# Patient Record
Sex: FEMALE | Race: WHITE | NOT HISPANIC OR LATINO | URBAN - METROPOLITAN AREA
[De-identification: names, ages, dates, MRNs, and addresses within clinical notes are randomized per-mention and may not be internally consistent; named-entity substitution may affect disease eponyms.]

---

## 2017-01-11 ENCOUNTER — INPATIENT (INPATIENT)
Facility: HOSPITAL | Age: 47
LOS: 5 days | Discharge: NURSING, REHAB OR LONG-TERM CARE | End: 2017-01-17
Attending: INTERNAL MEDICINE | Admitting: INTERNAL MEDICINE
Payer: MEDICARE

## 2017-01-11 VITALS
DIASTOLIC BLOOD PRESSURE: 72 MMHG | HEART RATE: 53 BPM | SYSTOLIC BLOOD PRESSURE: 121 MMHG | OXYGEN SATURATION: 100 % | RESPIRATION RATE: 18 BRPM | TEMPERATURE: 97 F

## 2017-01-11 DIAGNOSIS — K22.719 BARRETT'S ESOPHAGUS WITH DYSPLASIA, UNSPECIFIED: ICD-10-CM

## 2017-01-11 DIAGNOSIS — Z41.8 ENCOUNTER FOR OTHER PROCEDURES FOR PURPOSES OTHER THAN REMEDYING HEALTH STATE: ICD-10-CM

## 2017-01-11 DIAGNOSIS — F73 PROFOUND INTELLECTUAL DISABILITIES: ICD-10-CM

## 2017-01-11 DIAGNOSIS — L03.115 CELLULITIS OF RIGHT LOWER LIMB: ICD-10-CM

## 2017-01-11 LAB
BASE EXCESS BLDV CALC-SCNC: 6.8 MMOL/L — SIGNIFICANT CHANGE UP
BASOPHILS # BLD AUTO: 0.02 K/UL — SIGNIFICANT CHANGE UP (ref 0–0.2)
BASOPHILS NFR BLD AUTO: 0.2 % — SIGNIFICANT CHANGE UP (ref 0–2)
BLOOD GAS VENOUS - CREATININE: 0.59 MG/DL — SIGNIFICANT CHANGE UP (ref 0.5–1.3)
BUN SERPL-MCNC: 16 MG/DL — SIGNIFICANT CHANGE UP (ref 7–23)
CALCIUM SERPL-MCNC: 9 MG/DL — SIGNIFICANT CHANGE UP (ref 8.4–10.5)
CHLORIDE BLDV-SCNC: 105 MMOL/L — SIGNIFICANT CHANGE UP (ref 96–108)
CHLORIDE SERPL-SCNC: 103 MMOL/L — SIGNIFICANT CHANGE UP (ref 98–107)
CO2 SERPL-SCNC: 27 MMOL/L — SIGNIFICANT CHANGE UP (ref 22–31)
CREAT SERPL-MCNC: 0.61 MG/DL — SIGNIFICANT CHANGE UP (ref 0.5–1.3)
EOSINOPHIL # BLD AUTO: 0.03 K/UL — SIGNIFICANT CHANGE UP (ref 0–0.5)
EOSINOPHIL NFR BLD AUTO: 0.4 % — SIGNIFICANT CHANGE UP (ref 0–6)
GAS PNL BLDV: 136 MMOL/L — SIGNIFICANT CHANGE UP (ref 136–146)
GLUCOSE BLDV-MCNC: 86 — SIGNIFICANT CHANGE UP (ref 70–99)
GLUCOSE SERPL-MCNC: 87 MG/DL — SIGNIFICANT CHANGE UP (ref 70–99)
HCO3 BLDV-SCNC: 29 MMOL/L — HIGH (ref 20–27)
HCT VFR BLD CALC: 35.9 % — SIGNIFICANT CHANGE UP (ref 34.5–45)
HCT VFR BLDV CALC: 35.7 % — SIGNIFICANT CHANGE UP (ref 34.5–45)
HGB BLD-MCNC: 11.3 G/DL — LOW (ref 11.5–15.5)
HGB BLDV-MCNC: 11.6 G/DL — SIGNIFICANT CHANGE UP (ref 11.5–15.5)
HYPOCHROMIA BLD QL: SLIGHT — SIGNIFICANT CHANGE UP
IMM GRANULOCYTES NFR BLD AUTO: 0.4 % — SIGNIFICANT CHANGE UP (ref 0–1.5)
LACTATE BLDV-MCNC: 1.9 MMOL/L — SIGNIFICANT CHANGE UP (ref 0.5–2)
LYMPHOCYTES # BLD AUTO: 2.31 K/UL — SIGNIFICANT CHANGE UP (ref 1–3.3)
LYMPHOCYTES # BLD AUTO: 27.2 % — SIGNIFICANT CHANGE UP (ref 13–44)
MANUAL SMEAR VERIFICATION: SIGNIFICANT CHANGE UP
MCHC RBC-ENTMCNC: 29.6 PG — SIGNIFICANT CHANGE UP (ref 27–34)
MCHC RBC-ENTMCNC: 31.5 % — LOW (ref 32–36)
MCV RBC AUTO: 94 FL — SIGNIFICANT CHANGE UP (ref 80–100)
MONOCYTES # BLD AUTO: 1.7 K/UL — HIGH (ref 0–0.9)
MONOCYTES NFR BLD AUTO: 20 % — HIGH (ref 2–14)
NEUTROPHILS # BLD AUTO: 4.4 K/UL — SIGNIFICANT CHANGE UP (ref 1.8–7.4)
NEUTROPHILS NFR BLD AUTO: 51.8 % — SIGNIFICANT CHANGE UP (ref 43–77)
PCO2 BLDV: 51 MMHG — SIGNIFICANT CHANGE UP (ref 41–51)
PH BLDV: 7.41 PH — SIGNIFICANT CHANGE UP (ref 7.32–7.43)
PLATELET # BLD AUTO: 177 K/UL — SIGNIFICANT CHANGE UP (ref 150–400)
PLATELET COUNT - ESTIMATE: NORMAL — SIGNIFICANT CHANGE UP
PMV BLD: 12.1 FL — SIGNIFICANT CHANGE UP (ref 7–13)
PO2 BLDV: 25 MMHG — LOW (ref 35–40)
POTASSIUM BLDV-SCNC: 4 MMOL/L — SIGNIFICANT CHANGE UP (ref 3.4–4.5)
POTASSIUM SERPL-MCNC: 4.4 MMOL/L — SIGNIFICANT CHANGE UP (ref 3.5–5.3)
POTASSIUM SERPL-SCNC: 4.4 MMOL/L — SIGNIFICANT CHANGE UP (ref 3.5–5.3)
RBC # BLD: 3.82 M/UL — SIGNIFICANT CHANGE UP (ref 3.8–5.2)
RBC # FLD: 14.3 % — SIGNIFICANT CHANGE UP (ref 10.3–14.5)
SAO2 % BLDV: 38.6 % — LOW (ref 60–85)
SODIUM SERPL-SCNC: 142 MMOL/L — SIGNIFICANT CHANGE UP (ref 135–145)
WBC # BLD: 8.49 K/UL — SIGNIFICANT CHANGE UP (ref 3.8–10.5)
WBC # FLD AUTO: 8.49 K/UL — SIGNIFICANT CHANGE UP (ref 3.8–10.5)

## 2017-01-11 PROCEDURE — 99223 1ST HOSP IP/OBS HIGH 75: CPT

## 2017-01-11 PROCEDURE — 71010: CPT | Mod: 26

## 2017-01-11 RX ORDER — LANOLIN ALCOHOL/MO/W.PET/CERES
3 CREAM (GRAM) TOPICAL AT BEDTIME
Qty: 0 | Refills: 0 | Status: DISCONTINUED | OUTPATIENT
Start: 2017-01-11 | End: 2017-01-17

## 2017-01-11 RX ORDER — POLYETHYLENE GLYCOL 3350 17 G/17G
17 POWDER, FOR SOLUTION ORAL AT BEDTIME
Qty: 0 | Refills: 0 | Status: DISCONTINUED | OUTPATIENT
Start: 2017-01-11 | End: 2017-01-17

## 2017-01-11 RX ORDER — CLONAZEPAM 1 MG
2 TABLET ORAL
Qty: 0 | Refills: 0 | Status: DISCONTINUED | OUTPATIENT
Start: 2017-01-11 | End: 2017-01-12

## 2017-01-11 RX ORDER — ENOXAPARIN SODIUM 100 MG/ML
40 INJECTION SUBCUTANEOUS EVERY 24 HOURS
Qty: 0 | Refills: 0 | Status: DISCONTINUED | OUTPATIENT
Start: 2017-01-11 | End: 2017-01-17

## 2017-01-11 RX ORDER — DOCUSATE SODIUM 100 MG
200 CAPSULE ORAL DAILY
Qty: 0 | Refills: 0 | Status: DISCONTINUED | OUTPATIENT
Start: 2017-01-11 | End: 2017-01-17

## 2017-01-11 RX ORDER — SODIUM CHLORIDE 9 MG/ML
1000 INJECTION INTRAMUSCULAR; INTRAVENOUS; SUBCUTANEOUS ONCE
Qty: 0 | Refills: 0 | Status: COMPLETED | OUTPATIENT
Start: 2017-01-11 | End: 2017-01-11

## 2017-01-11 RX ORDER — METOCLOPRAMIDE HCL 10 MG
5 TABLET ORAL
Qty: 0 | Refills: 0 | Status: DISCONTINUED | OUTPATIENT
Start: 2017-01-11 | End: 2017-01-17

## 2017-01-11 RX ORDER — TRIHEXYPHENIDYL HCL 2 MG
1 TABLET ORAL DAILY
Qty: 0 | Refills: 0 | Status: DISCONTINUED | OUTPATIENT
Start: 2017-01-11 | End: 2017-01-17

## 2017-01-11 RX ORDER — MAGNESIUM HYDROXIDE 400 MG/1
30 TABLET, CHEWABLE ORAL
Qty: 0 | Refills: 0 | COMMUNITY

## 2017-01-11 RX ORDER — TRAZODONE HCL 50 MG
100 TABLET ORAL AT BEDTIME
Qty: 0 | Refills: 0 | Status: DISCONTINUED | OUTPATIENT
Start: 2017-01-11 | End: 2017-01-17

## 2017-01-11 RX ORDER — ACETAMINOPHEN 500 MG
650 TABLET ORAL EVERY 6 HOURS
Qty: 0 | Refills: 0 | Status: DISCONTINUED | OUTPATIENT
Start: 2017-01-11 | End: 2017-01-17

## 2017-01-11 RX ORDER — LACTOBACILLUS ACIDOPHILUS 100MM CELL
1 CAPSULE ORAL DAILY
Qty: 0 | Refills: 0 | Status: DISCONTINUED | OUTPATIENT
Start: 2017-01-11 | End: 2017-01-17

## 2017-01-11 RX ORDER — ASENAPINE MALEATE 10 MG/1
5 TABLET SUBLINGUAL EVERY 24 HOURS
Qty: 0 | Refills: 0 | Status: DISCONTINUED | OUTPATIENT
Start: 2017-01-12 | End: 2017-01-17

## 2017-01-11 RX ORDER — PANTOPRAZOLE SODIUM 20 MG/1
40 TABLET, DELAYED RELEASE ORAL
Qty: 0 | Refills: 0 | Status: DISCONTINUED | OUTPATIENT
Start: 2017-01-11 | End: 2017-01-17

## 2017-01-11 RX ORDER — DIVALPROEX SODIUM 500 MG/1
1250 TABLET, DELAYED RELEASE ORAL AT BEDTIME
Qty: 0 | Refills: 0 | Status: DISCONTINUED | OUTPATIENT
Start: 2017-01-11 | End: 2017-01-17

## 2017-01-11 RX ORDER — SIMETHICONE 80 MG/1
80 TABLET, CHEWABLE ORAL THREE TIMES A DAY
Qty: 0 | Refills: 0 | Status: DISCONTINUED | OUTPATIENT
Start: 2017-01-11 | End: 2017-01-17

## 2017-01-11 RX ORDER — DIVALPROEX SODIUM 500 MG/1
1000 TABLET, DELAYED RELEASE ORAL DAILY
Qty: 0 | Refills: 0 | Status: DISCONTINUED | OUTPATIENT
Start: 2017-01-11 | End: 2017-01-17

## 2017-01-11 RX ORDER — FOLIC ACID 0.8 MG
1 TABLET ORAL DAILY
Qty: 0 | Refills: 0 | Status: DISCONTINUED | OUTPATIENT
Start: 2017-01-11 | End: 2017-01-17

## 2017-01-11 RX ADMIN — Medication 2 MILLIGRAM(S): at 22:58

## 2017-01-11 RX ADMIN — Medication 3 MILLIGRAM(S): at 22:28

## 2017-01-11 RX ADMIN — DIVALPROEX SODIUM 1250 MILLIGRAM(S): 500 TABLET, DELAYED RELEASE ORAL at 22:28

## 2017-01-11 RX ADMIN — SIMETHICONE 80 MILLIGRAM(S): 80 TABLET, CHEWABLE ORAL at 22:28

## 2017-01-11 RX ADMIN — Medication 100 MILLIGRAM(S): at 22:54

## 2017-01-11 RX ADMIN — Medication 100 MILLIGRAM(S): at 13:25

## 2017-01-11 RX ADMIN — Medication 650 MILLIGRAM(S): at 18:57

## 2017-01-11 RX ADMIN — ENOXAPARIN SODIUM 40 MILLIGRAM(S): 100 INJECTION SUBCUTANEOUS at 22:28

## 2017-01-11 RX ADMIN — Medication 100 MILLIGRAM(S): at 22:28

## 2017-01-11 RX ADMIN — SODIUM CHLORIDE 1000 MILLILITER(S): 9 INJECTION INTRAMUSCULAR; INTRAVENOUS; SUBCUTANEOUS at 13:00

## 2017-01-11 NOTE — ED PROVIDER NOTE - MEDICAL DECISION MAKING DETAILS
45 y/o F from  with increasing right thigh cellulitis since yesterday.  Febrile.  Check labs, cxs, CXR, ECG.  Likely admission for IV antibiotics and continued evaluation.

## 2017-01-11 NOTE — ED ADULT NURSE REASSESSMENT NOTE - NS ED NURSE REASSESS COMMENT FT1
Patient is in NAD, and has room available.  Report given to nurse on floor via phone.  Patient awaiting transportation. [facility] staff members remain present at the bedside.  Will continue to monitor patient closely. Denice VAZ

## 2017-01-11 NOTE — H&P ADULT. - HISTORY OF PRESENT ILLNESS
47 y/o F with profound MR (nonverbal at baseline.) GERD with Garcia's esophagus, presents from [facility] redness and induration on R thigh.  Symptoms were first noticed by staff yesterday.  She was evaluated by her doctor this morning, and was transferred to the ED for further management.  Per [facility] staff member at bedside, the only other abnormal thing she noticed was that patient was shivering today.      In the ED, she was given clindamycin for treatment of cellulitis, and 1L NS.  She was noted to have temp of 101.9.

## 2017-01-11 NOTE — ED PROVIDER NOTE - PMH
Amenorrhea (ICD9 626.0)    Constipation (ICD9 564.00)    GERD (Gastroesophageal Reflux Disease) (ICD9 530.81)    MR (Mental Retardation) (ICD9 319)

## 2017-01-11 NOTE — ED ADULT NURSE NOTE - OBJECTIVE STATEMENT
Pt is a 45 y/o female brought in from [facility] for eval of right thigh cellulitis that staff states is from this am. +bruising type discoloration noted with redness of right thigh. Staff denies any knowledge of fall or injury or trauma.  No tracking noted. Redness outlined. MD ordoñez in progress. Will continue to monitor.

## 2017-01-11 NOTE — ED PROVIDER NOTE - ATTENDING CONTRIBUTION TO CARE
I performed a face to face evaluation of this patient and performed a full history and physical examination on the patient.  I agree with the resident's history, physical examination, and plan of the patient.

## 2017-01-11 NOTE — ED PROVIDER NOTE - OBJECTIVE STATEMENT
45 yo nonverbal F from [facility] w/ PMH of Garcia's esophagus, dysphagia, gastroparesis, profound mental retardation presents with an indurated lesion on the right thigh. Pt presented with 2 aides. According to her aides, the lesion was first noticed yesterday afternoon. The pt was seen by an MD at her facility this morning and sent to the ED. The aides deny that she was having an fevers, nausea, or vomiting, but they do relate that she was having chills. Deny any other lesions or complaints at this time. 47 yo nonverbal F from [facility] w/ PMH of Garcia's esophagus, dysphagia, gastroparesis, profound mental retardation presents with an indurated lesion on the right thigh. Pt presented with 2 aides. According to her aides, the lesion was first noticed yesterday afternoon. The pt was seen by an MD at her facility this morning and sent to the ED. The aides deny that she was having an fevers, nausea, or vomiting, but they do relate that she was having chills. Deny any other lesions or complaints at this time.  Attending - Agree with above.  I evaluated patient myself.  Reviewed transfer paperwork.  45 y/o F with multiple medical problems including MR, schizophrenia, nonverbal.  2 Aides from  who know patient well at bedside.  Noted to have expanding area of erythema and induration on right thigh since yesterday.  No known trauma to area.  Has been eating and drinking as normal.  No noted urinary problem.  No other complaint per staff.

## 2017-01-11 NOTE — ED PROVIDER NOTE - PHYSICAL EXAMINATION
ATTENDING PHYSICAL EXAM DR. Tom ***GEN - No resp distress ***HEAD - NC/AT ***EYES/NOSE - PERRL, mucous membranes moist, no discharge ***THROAT: Oral cavity and pharynx normal. no upper teeth, No inflammation, swelling, exudate, or lesions.  ***NECK: Neck supple, non-tender without lymphadenopathy, no masses, no JVD.   ***PULMONARY - CTA b/l, symmetric breath sounds. ***CARDIAC -s1s2, RRR, 2-3/6 sys murmur LUSB  ***ABDOMEN - +BS, ND, NT, soft, no guarding, no rebound, no masses   ***BACK - no CVA tenderness, Normal  spine ***EXTREMITIES - Noted 28-20cm well demarcated area of erythema and induration on right medial aspect of thigh with areas of ecchymosis, no fluctuence appreciated, no pointing or drainage.  Pos warmth.  Lower leg with normal skin, no apparent calf tenderness, no edema, distal pulses and cap refill intact.  ***NEUROLOGIC - alert/eyes open, looks at examiner, no verbal response, does not follow commands.

## 2017-01-11 NOTE — ED ADULT TRIAGE NOTE - CHIEF COMPLAINT QUOTE
Arrives from [facility] with two Aides for evaluation of "large approximate 8" x 5" induration warm on R upper thigh with  some dark decolorization"  Pt is non-verbal in triage and noted to be restless.

## 2017-01-12 LAB
BUN SERPL-MCNC: 11 MG/DL — SIGNIFICANT CHANGE UP (ref 7–23)
CALCIUM SERPL-MCNC: 8.9 MG/DL — SIGNIFICANT CHANGE UP (ref 8.4–10.5)
CHLORIDE SERPL-SCNC: 101 MMOL/L — SIGNIFICANT CHANGE UP (ref 98–107)
CO2 SERPL-SCNC: 27 MMOL/L — SIGNIFICANT CHANGE UP (ref 22–31)
CREAT SERPL-MCNC: 0.57 MG/DL — SIGNIFICANT CHANGE UP (ref 0.5–1.3)
GLUCOSE SERPL-MCNC: 104 MG/DL — HIGH (ref 70–99)
HCT VFR BLD CALC: 32.6 % — LOW (ref 34.5–45)
HGB BLD-MCNC: 10.5 G/DL — LOW (ref 11.5–15.5)
MAGNESIUM SERPL-MCNC: 1.8 MG/DL — SIGNIFICANT CHANGE UP (ref 1.6–2.6)
MCHC RBC-ENTMCNC: 29.5 PG — SIGNIFICANT CHANGE UP (ref 27–34)
MCHC RBC-ENTMCNC: 32.2 % — SIGNIFICANT CHANGE UP (ref 32–36)
MCV RBC AUTO: 91.6 FL — SIGNIFICANT CHANGE UP (ref 80–100)
PLATELET # BLD AUTO: 172 K/UL — SIGNIFICANT CHANGE UP (ref 150–400)
PMV BLD: 11.3 FL — SIGNIFICANT CHANGE UP (ref 7–13)
POTASSIUM SERPL-MCNC: 4.3 MMOL/L — SIGNIFICANT CHANGE UP (ref 3.5–5.3)
POTASSIUM SERPL-SCNC: 4.3 MMOL/L — SIGNIFICANT CHANGE UP (ref 3.5–5.3)
RBC # BLD: 3.56 M/UL — LOW (ref 3.8–5.2)
RBC # FLD: 14.1 % — SIGNIFICANT CHANGE UP (ref 10.3–14.5)
SODIUM SERPL-SCNC: 139 MMOL/L — SIGNIFICANT CHANGE UP (ref 135–145)
SPECIMEN SOURCE: SIGNIFICANT CHANGE UP
SPECIMEN SOURCE: SIGNIFICANT CHANGE UP
WBC # BLD: 7.12 K/UL — SIGNIFICANT CHANGE UP (ref 3.8–10.5)
WBC # FLD AUTO: 7.12 K/UL — SIGNIFICANT CHANGE UP (ref 3.8–10.5)

## 2017-01-12 RX ORDER — ACETAMINOPHEN 500 MG
650 TABLET ORAL ONCE
Qty: 0 | Refills: 0 | Status: COMPLETED | OUTPATIENT
Start: 2017-01-12 | End: 2017-01-12

## 2017-01-12 RX ORDER — KETOROLAC TROMETHAMINE 30 MG/ML
15 SYRINGE (ML) INJECTION ONCE
Qty: 0 | Refills: 0 | Status: DISCONTINUED | OUTPATIENT
Start: 2017-01-12 | End: 2017-01-12

## 2017-01-12 RX ORDER — VANCOMYCIN HCL 1 G
VIAL (EA) INTRAVENOUS
Qty: 0 | Refills: 0 | Status: DISCONTINUED | OUTPATIENT
Start: 2017-01-12 | End: 2017-01-14

## 2017-01-12 RX ORDER — CEFAZOLIN SODIUM 1 G
2000 VIAL (EA) INJECTION EVERY 8 HOURS
Qty: 0 | Refills: 0 | Status: DISCONTINUED | OUTPATIENT
Start: 2017-01-12 | End: 2017-01-17

## 2017-01-12 RX ORDER — CLONAZEPAM 1 MG
2 TABLET ORAL
Qty: 0 | Refills: 0 | Status: DISCONTINUED | OUTPATIENT
Start: 2017-01-12 | End: 2017-01-17

## 2017-01-12 RX ORDER — VANCOMYCIN HCL 1 G
1000 VIAL (EA) INTRAVENOUS EVERY 12 HOURS
Qty: 0 | Refills: 0 | Status: DISCONTINUED | OUTPATIENT
Start: 2017-01-13 | End: 2017-01-14

## 2017-01-12 RX ORDER — ACETAMINOPHEN 500 MG
650 TABLET ORAL EVERY 6 HOURS
Qty: 0 | Refills: 0 | Status: DISCONTINUED | OUTPATIENT
Start: 2017-01-12 | End: 2017-01-17

## 2017-01-12 RX ORDER — VANCOMYCIN HCL 1 G
1000 VIAL (EA) INTRAVENOUS ONCE
Qty: 0 | Refills: 0 | Status: COMPLETED | OUTPATIENT
Start: 2017-01-12 | End: 2017-01-12

## 2017-01-12 RX ADMIN — ENOXAPARIN SODIUM 40 MILLIGRAM(S): 100 INJECTION SUBCUTANEOUS at 21:46

## 2017-01-12 RX ADMIN — SIMETHICONE 80 MILLIGRAM(S): 80 TABLET, CHEWABLE ORAL at 21:46

## 2017-01-12 RX ADMIN — Medication 1 TABLET(S): at 11:31

## 2017-01-12 RX ADMIN — Medication 100 MILLIGRAM(S): at 21:45

## 2017-01-12 RX ADMIN — Medication 650 MILLIGRAM(S): at 05:47

## 2017-01-12 RX ADMIN — Medication 250 MILLIGRAM(S): at 19:12

## 2017-01-12 RX ADMIN — Medication 1 MILLIGRAM(S): at 12:54

## 2017-01-12 RX ADMIN — Medication 100 MILLIGRAM(S): at 05:38

## 2017-01-12 RX ADMIN — Medication 100 MILLIGRAM(S): at 23:12

## 2017-01-12 RX ADMIN — Medication 10 MILLILITER(S): at 15:31

## 2017-01-12 RX ADMIN — Medication 3 MILLIGRAM(S): at 23:12

## 2017-01-12 RX ADMIN — Medication 5 MILLIGRAM(S): at 12:54

## 2017-01-12 RX ADMIN — Medication 650 MILLIGRAM(S): at 06:40

## 2017-01-12 RX ADMIN — Medication 1 MILLIGRAM(S): at 11:31

## 2017-01-12 RX ADMIN — Medication 5 MILLIGRAM(S): at 06:24

## 2017-01-12 RX ADMIN — Medication 2 MILLIGRAM(S): at 05:38

## 2017-01-12 RX ADMIN — DIVALPROEX SODIUM 1000 MILLIGRAM(S): 500 TABLET, DELAYED RELEASE ORAL at 11:31

## 2017-01-12 RX ADMIN — Medication 2 MILLIGRAM(S): at 17:56

## 2017-01-12 RX ADMIN — DIVALPROEX SODIUM 1250 MILLIGRAM(S): 500 TABLET, DELAYED RELEASE ORAL at 23:12

## 2017-01-12 RX ADMIN — Medication 15 MILLIGRAM(S): at 19:27

## 2017-01-12 RX ADMIN — Medication 200 MILLIGRAM(S): at 11:31

## 2017-01-12 RX ADMIN — Medication 10 MILLILITER(S): at 05:38

## 2017-01-12 RX ADMIN — Medication 1 TABLET(S): at 12:55

## 2017-01-12 RX ADMIN — SIMETHICONE 80 MILLIGRAM(S): 80 TABLET, CHEWABLE ORAL at 05:38

## 2017-01-12 RX ADMIN — Medication 15 MILLIGRAM(S): at 19:12

## 2017-01-12 RX ADMIN — Medication 650 MILLIGRAM(S): at 15:27

## 2017-01-12 RX ADMIN — PANTOPRAZOLE SODIUM 40 MILLIGRAM(S): 20 TABLET, DELAYED RELEASE ORAL at 06:24

## 2017-01-12 RX ADMIN — POLYETHYLENE GLYCOL 3350 17 GRAM(S): 17 POWDER, FOR SOLUTION ORAL at 21:45

## 2017-01-12 RX ADMIN — Medication 10 MILLILITER(S): at 21:46

## 2017-01-12 RX ADMIN — SIMETHICONE 80 MILLIGRAM(S): 80 TABLET, CHEWABLE ORAL at 15:27

## 2017-01-12 RX ADMIN — ASENAPINE MALEATE 5 MILLIGRAM(S): 10 TABLET SUBLINGUAL at 05:38

## 2017-01-13 LAB
BACTERIA BLD CULT: SIGNIFICANT CHANGE UP
BUN SERPL-MCNC: 21 MG/DL — SIGNIFICANT CHANGE UP (ref 7–23)
CALCIUM SERPL-MCNC: 8.5 MG/DL — SIGNIFICANT CHANGE UP (ref 8.4–10.5)
CHLORIDE SERPL-SCNC: 102 MMOL/L — SIGNIFICANT CHANGE UP (ref 98–107)
CO2 SERPL-SCNC: 28 MMOL/L — SIGNIFICANT CHANGE UP (ref 22–31)
CREAT SERPL-MCNC: 0.59 MG/DL — SIGNIFICANT CHANGE UP (ref 0.5–1.3)
GLUCOSE SERPL-MCNC: 104 MG/DL — HIGH (ref 70–99)
HCT VFR BLD CALC: 31 % — LOW (ref 34.5–45)
HGB BLD-MCNC: 9.9 G/DL — LOW (ref 11.5–15.5)
MCHC RBC-ENTMCNC: 29.6 PG — SIGNIFICANT CHANGE UP (ref 27–34)
MCHC RBC-ENTMCNC: 31.9 % — LOW (ref 32–36)
MCV RBC AUTO: 92.5 FL — SIGNIFICANT CHANGE UP (ref 80–100)
PLATELET # BLD AUTO: 190 K/UL — SIGNIFICANT CHANGE UP (ref 150–400)
PMV BLD: 11.1 FL — SIGNIFICANT CHANGE UP (ref 7–13)
POTASSIUM SERPL-MCNC: 4.1 MMOL/L — SIGNIFICANT CHANGE UP (ref 3.5–5.3)
POTASSIUM SERPL-SCNC: 4.1 MMOL/L — SIGNIFICANT CHANGE UP (ref 3.5–5.3)
RBC # BLD: 3.35 M/UL — LOW (ref 3.8–5.2)
RBC # FLD: 14.1 % — SIGNIFICANT CHANGE UP (ref 10.3–14.5)
SODIUM SERPL-SCNC: 142 MMOL/L — SIGNIFICANT CHANGE UP (ref 135–145)
WBC # BLD: 6.53 K/UL — SIGNIFICANT CHANGE UP (ref 3.8–10.5)
WBC # FLD AUTO: 6.53 K/UL — SIGNIFICANT CHANGE UP (ref 3.8–10.5)

## 2017-01-13 RX ADMIN — Medication 10 MILLILITER(S): at 05:38

## 2017-01-13 RX ADMIN — Medication 1 MILLIGRAM(S): at 14:30

## 2017-01-13 RX ADMIN — POLYETHYLENE GLYCOL 3350 17 GRAM(S): 17 POWDER, FOR SOLUTION ORAL at 22:56

## 2017-01-13 RX ADMIN — Medication 1 MILLIGRAM(S): at 13:10

## 2017-01-13 RX ADMIN — Medication 10 MILLILITER(S): at 22:54

## 2017-01-13 RX ADMIN — Medication 200 MILLIGRAM(S): at 13:10

## 2017-01-13 RX ADMIN — ASENAPINE MALEATE 5 MILLIGRAM(S): 10 TABLET SUBLINGUAL at 07:42

## 2017-01-13 RX ADMIN — Medication 250 MILLIGRAM(S): at 05:39

## 2017-01-13 RX ADMIN — SIMETHICONE 80 MILLIGRAM(S): 80 TABLET, CHEWABLE ORAL at 22:51

## 2017-01-13 RX ADMIN — PANTOPRAZOLE SODIUM 40 MILLIGRAM(S): 20 TABLET, DELAYED RELEASE ORAL at 05:38

## 2017-01-13 RX ADMIN — DIVALPROEX SODIUM 1000 MILLIGRAM(S): 500 TABLET, DELAYED RELEASE ORAL at 14:32

## 2017-01-13 RX ADMIN — Medication 5 MILLIGRAM(S): at 13:10

## 2017-01-13 RX ADMIN — Medication 100 MILLIGRAM(S): at 22:50

## 2017-01-13 RX ADMIN — SIMETHICONE 80 MILLIGRAM(S): 80 TABLET, CHEWABLE ORAL at 13:10

## 2017-01-13 RX ADMIN — DIVALPROEX SODIUM 1250 MILLIGRAM(S): 500 TABLET, DELAYED RELEASE ORAL at 22:53

## 2017-01-13 RX ADMIN — Medication 2 MILLIGRAM(S): at 18:38

## 2017-01-13 RX ADMIN — Medication 2 MILLIGRAM(S): at 05:39

## 2017-01-13 RX ADMIN — ENOXAPARIN SODIUM 40 MILLIGRAM(S): 100 INJECTION SUBCUTANEOUS at 22:50

## 2017-01-13 RX ADMIN — Medication 1 TABLET(S): at 13:12

## 2017-01-13 RX ADMIN — Medication 1 TABLET(S): at 13:10

## 2017-01-13 RX ADMIN — Medication 100 MILLIGRAM(S): at 21:30

## 2017-01-13 RX ADMIN — Medication 10 MILLILITER(S): at 13:09

## 2017-01-13 RX ADMIN — Medication 5 MILLIGRAM(S): at 16:00

## 2017-01-13 RX ADMIN — Medication 100 MILLIGRAM(S): at 07:41

## 2017-01-13 RX ADMIN — SIMETHICONE 80 MILLIGRAM(S): 80 TABLET, CHEWABLE ORAL at 05:39

## 2017-01-13 RX ADMIN — Medication 5 MILLIGRAM(S): at 05:39

## 2017-01-13 RX ADMIN — Medication 100 MILLIGRAM(S): at 13:12

## 2017-01-13 RX ADMIN — Medication 250 MILLIGRAM(S): at 18:38

## 2017-01-13 NOTE — DIETITIAN INITIAL EVALUATION ADULT. - OTHER INFO
Pt was admitted with Celullitis of right lower extremity. Received nutrition consult for chewing/swallowing difficulty. Pt continues on Pureed diet and remains with good tolerance. Noted with 100% of meal completion. Denies any nausea/vomiting/diarrhea or constipation. Pt's Aide was not able to comment on weight status.

## 2017-01-14 LAB
BUN SERPL-MCNC: 18 MG/DL — SIGNIFICANT CHANGE UP (ref 7–23)
CALCIUM SERPL-MCNC: 8.7 MG/DL — SIGNIFICANT CHANGE UP (ref 8.4–10.5)
CHLORIDE SERPL-SCNC: 100 MMOL/L — SIGNIFICANT CHANGE UP (ref 98–107)
CO2 SERPL-SCNC: 29 MMOL/L — SIGNIFICANT CHANGE UP (ref 22–31)
CREAT SERPL-MCNC: 0.61 MG/DL — SIGNIFICANT CHANGE UP (ref 0.5–1.3)
GLUCOSE SERPL-MCNC: 80 MG/DL — SIGNIFICANT CHANGE UP (ref 70–99)
GRAM STAIN BLOOD: SIGNIFICANT CHANGE UP
GRAM STAIN BLOOD: SIGNIFICANT CHANGE UP
HCT VFR BLD CALC: 31.6 % — LOW (ref 34.5–45)
HGB BLD-MCNC: 10.3 G/DL — LOW (ref 11.5–15.5)
MCHC RBC-ENTMCNC: 29.9 PG — SIGNIFICANT CHANGE UP (ref 27–34)
MCHC RBC-ENTMCNC: 32.6 % — SIGNIFICANT CHANGE UP (ref 32–36)
MCV RBC AUTO: 91.9 FL — SIGNIFICANT CHANGE UP (ref 80–100)
PLATELET # BLD AUTO: 234 K/UL — SIGNIFICANT CHANGE UP (ref 150–400)
PMV BLD: 10.4 FL — SIGNIFICANT CHANGE UP (ref 7–13)
POTASSIUM SERPL-MCNC: 4.4 MMOL/L — SIGNIFICANT CHANGE UP (ref 3.5–5.3)
POTASSIUM SERPL-SCNC: 4.4 MMOL/L — SIGNIFICANT CHANGE UP (ref 3.5–5.3)
RBC # BLD: 3.44 M/UL — LOW (ref 3.8–5.2)
RBC # FLD: 14.3 % — SIGNIFICANT CHANGE UP (ref 10.3–14.5)
SODIUM SERPL-SCNC: 140 MMOL/L — SIGNIFICANT CHANGE UP (ref 135–145)
SPECIMEN SOURCE: SIGNIFICANT CHANGE UP
SPECIMEN SOURCE: SIGNIFICANT CHANGE UP
VANCOMYCIN TROUGH SERPL-MCNC: 7.6 UG/ML — LOW (ref 10–20)
WBC # BLD: 7.49 K/UL — SIGNIFICANT CHANGE UP (ref 3.8–10.5)
WBC # FLD AUTO: 7.49 K/UL — SIGNIFICANT CHANGE UP (ref 3.8–10.5)

## 2017-01-14 RX ORDER — VANCOMYCIN HCL 1 G
1250 VIAL (EA) INTRAVENOUS EVERY 12 HOURS
Qty: 0 | Refills: 0 | Status: DISCONTINUED | OUTPATIENT
Start: 2017-01-14 | End: 2017-01-17

## 2017-01-14 RX ADMIN — Medication 10 MILLILITER(S): at 15:19

## 2017-01-14 RX ADMIN — DIVALPROEX SODIUM 1250 MILLIGRAM(S): 500 TABLET, DELAYED RELEASE ORAL at 22:22

## 2017-01-14 RX ADMIN — Medication 2 MILLIGRAM(S): at 07:18

## 2017-01-14 RX ADMIN — SIMETHICONE 80 MILLIGRAM(S): 80 TABLET, CHEWABLE ORAL at 22:31

## 2017-01-14 RX ADMIN — Medication 100 MILLIGRAM(S): at 07:20

## 2017-01-14 RX ADMIN — Medication 200 MILLIGRAM(S): at 12:00

## 2017-01-14 RX ADMIN — Medication 1 TABLET(S): at 12:00

## 2017-01-14 RX ADMIN — Medication 166.67 MILLIGRAM(S): at 09:34

## 2017-01-14 RX ADMIN — ASENAPINE MALEATE 5 MILLIGRAM(S): 10 TABLET SUBLINGUAL at 07:18

## 2017-01-14 RX ADMIN — Medication 650 MILLIGRAM(S): at 23:23

## 2017-01-14 RX ADMIN — Medication 5 MILLIGRAM(S): at 07:18

## 2017-01-14 RX ADMIN — SIMETHICONE 80 MILLIGRAM(S): 80 TABLET, CHEWABLE ORAL at 07:18

## 2017-01-14 RX ADMIN — Medication 650 MILLIGRAM(S): at 22:23

## 2017-01-14 RX ADMIN — Medication 100 MILLIGRAM(S): at 22:21

## 2017-01-14 RX ADMIN — PANTOPRAZOLE SODIUM 40 MILLIGRAM(S): 20 TABLET, DELAYED RELEASE ORAL at 07:17

## 2017-01-14 RX ADMIN — ENOXAPARIN SODIUM 40 MILLIGRAM(S): 100 INJECTION SUBCUTANEOUS at 22:20

## 2017-01-14 RX ADMIN — Medication 100 MILLIGRAM(S): at 22:28

## 2017-01-14 RX ADMIN — Medication 1 MILLIGRAM(S): at 12:00

## 2017-01-14 RX ADMIN — Medication 2 MILLIGRAM(S): at 18:58

## 2017-01-14 RX ADMIN — Medication 166.67 MILLIGRAM(S): at 21:47

## 2017-01-14 RX ADMIN — SIMETHICONE 80 MILLIGRAM(S): 80 TABLET, CHEWABLE ORAL at 15:24

## 2017-01-14 RX ADMIN — Medication 10 MILLILITER(S): at 07:19

## 2017-01-14 RX ADMIN — Medication 5 MILLIGRAM(S): at 18:58

## 2017-01-14 RX ADMIN — Medication 5 MILLIGRAM(S): at 12:00

## 2017-01-14 RX ADMIN — Medication 10 MILLILITER(S): at 22:20

## 2017-01-14 RX ADMIN — Medication 100 MILLIGRAM(S): at 15:18

## 2017-01-14 RX ADMIN — DIVALPROEX SODIUM 1000 MILLIGRAM(S): 500 TABLET, DELAYED RELEASE ORAL at 12:00

## 2017-01-15 LAB
BUN SERPL-MCNC: 21 MG/DL — SIGNIFICANT CHANGE UP (ref 7–23)
CALCIUM SERPL-MCNC: 9.1 MG/DL — SIGNIFICANT CHANGE UP (ref 8.4–10.5)
CHLORIDE SERPL-SCNC: 100 MMOL/L — SIGNIFICANT CHANGE UP (ref 98–107)
CO2 SERPL-SCNC: 29 MMOL/L — SIGNIFICANT CHANGE UP (ref 22–31)
CREAT SERPL-MCNC: 0.64 MG/DL — SIGNIFICANT CHANGE UP (ref 0.5–1.3)
GLUCOSE SERPL-MCNC: 90 MG/DL — SIGNIFICANT CHANGE UP (ref 70–99)
HCT VFR BLD CALC: 31.6 % — LOW (ref 34.5–45)
HGB BLD-MCNC: 10.3 G/DL — LOW (ref 11.5–15.5)
MCHC RBC-ENTMCNC: 29.9 PG — SIGNIFICANT CHANGE UP (ref 27–34)
MCHC RBC-ENTMCNC: 32.6 % — SIGNIFICANT CHANGE UP (ref 32–36)
MCV RBC AUTO: 91.9 FL — SIGNIFICANT CHANGE UP (ref 80–100)
PLATELET # BLD AUTO: 245 K/UL — SIGNIFICANT CHANGE UP (ref 150–400)
PMV BLD: 10.2 FL — SIGNIFICANT CHANGE UP (ref 7–13)
POTASSIUM SERPL-MCNC: 4.8 MMOL/L — SIGNIFICANT CHANGE UP (ref 3.5–5.3)
POTASSIUM SERPL-SCNC: 4.8 MMOL/L — SIGNIFICANT CHANGE UP (ref 3.5–5.3)
RBC # BLD: 3.44 M/UL — LOW (ref 3.8–5.2)
RBC # FLD: 14 % — SIGNIFICANT CHANGE UP (ref 10.3–14.5)
SODIUM SERPL-SCNC: 141 MMOL/L — SIGNIFICANT CHANGE UP (ref 135–145)
VANCOMYCIN TROUGH SERPL-MCNC: 10.6 UG/ML — SIGNIFICANT CHANGE UP (ref 10–20)
WBC # BLD: 7.03 K/UL — SIGNIFICANT CHANGE UP (ref 3.8–10.5)
WBC # FLD AUTO: 7.03 K/UL — SIGNIFICANT CHANGE UP (ref 3.8–10.5)

## 2017-01-15 RX ADMIN — Medication 100 MILLIGRAM(S): at 22:35

## 2017-01-15 RX ADMIN — DIVALPROEX SODIUM 1250 MILLIGRAM(S): 500 TABLET, DELAYED RELEASE ORAL at 22:39

## 2017-01-15 RX ADMIN — Medication 166.67 MILLIGRAM(S): at 23:15

## 2017-01-15 RX ADMIN — SIMETHICONE 80 MILLIGRAM(S): 80 TABLET, CHEWABLE ORAL at 09:39

## 2017-01-15 RX ADMIN — Medication 2 MILLIGRAM(S): at 18:22

## 2017-01-15 RX ADMIN — Medication 10 MILLILITER(S): at 14:58

## 2017-01-15 RX ADMIN — Medication 1 MILLIGRAM(S): at 14:57

## 2017-01-15 RX ADMIN — Medication 10 MILLILITER(S): at 07:21

## 2017-01-15 RX ADMIN — Medication 1 TABLET(S): at 14:56

## 2017-01-15 RX ADMIN — ASENAPINE MALEATE 5 MILLIGRAM(S): 10 TABLET SUBLINGUAL at 06:59

## 2017-01-15 RX ADMIN — ENOXAPARIN SODIUM 40 MILLIGRAM(S): 100 INJECTION SUBCUTANEOUS at 22:38

## 2017-01-15 RX ADMIN — Medication 5 MILLIGRAM(S): at 14:56

## 2017-01-15 RX ADMIN — Medication 1 MILLIGRAM(S): at 14:59

## 2017-01-15 RX ADMIN — Medication 200 MILLIGRAM(S): at 14:57

## 2017-01-15 RX ADMIN — Medication 5 MILLIGRAM(S): at 06:59

## 2017-01-15 RX ADMIN — PANTOPRAZOLE SODIUM 40 MILLIGRAM(S): 20 TABLET, DELAYED RELEASE ORAL at 06:59

## 2017-01-15 RX ADMIN — Medication 3 MILLIGRAM(S): at 22:35

## 2017-01-15 RX ADMIN — Medication 10 MILLILITER(S): at 22:35

## 2017-01-15 RX ADMIN — SIMETHICONE 80 MILLIGRAM(S): 80 TABLET, CHEWABLE ORAL at 14:57

## 2017-01-15 RX ADMIN — Medication 100 MILLIGRAM(S): at 06:58

## 2017-01-15 RX ADMIN — DIVALPROEX SODIUM 1000 MILLIGRAM(S): 500 TABLET, DELAYED RELEASE ORAL at 14:57

## 2017-01-15 RX ADMIN — Medication 5 MILLIGRAM(S): at 18:23

## 2017-01-15 RX ADMIN — Medication 166.67 MILLIGRAM(S): at 10:29

## 2017-01-15 RX ADMIN — Medication 100 MILLIGRAM(S): at 14:58

## 2017-01-15 RX ADMIN — SIMETHICONE 80 MILLIGRAM(S): 80 TABLET, CHEWABLE ORAL at 22:39

## 2017-01-15 RX ADMIN — Medication 2 MILLIGRAM(S): at 06:58

## 2017-01-16 ENCOUNTER — TRANSCRIPTION ENCOUNTER (OUTPATIENT)
Age: 47
End: 2017-01-16

## 2017-01-16 LAB
BACTERIA BLD CULT: SIGNIFICANT CHANGE UP
BUN SERPL-MCNC: 21 MG/DL — SIGNIFICANT CHANGE UP (ref 7–23)
C DIFF TOX GENS STL QL NAA+PROBE: SIGNIFICANT CHANGE UP
CALCIUM SERPL-MCNC: 8.7 MG/DL — SIGNIFICANT CHANGE UP (ref 8.4–10.5)
CHLORIDE SERPL-SCNC: 103 MMOL/L — SIGNIFICANT CHANGE UP (ref 98–107)
CO2 SERPL-SCNC: 29 MMOL/L — SIGNIFICANT CHANGE UP (ref 22–31)
CREAT SERPL-MCNC: 0.56 MG/DL — SIGNIFICANT CHANGE UP (ref 0.5–1.3)
GLUCOSE SERPL-MCNC: 91 MG/DL — SIGNIFICANT CHANGE UP (ref 70–99)
HCT VFR BLD CALC: 31.1 % — LOW (ref 34.5–45)
HGB BLD-MCNC: 9.9 G/DL — LOW (ref 11.5–15.5)
MCHC RBC-ENTMCNC: 29.6 PG — SIGNIFICANT CHANGE UP (ref 27–34)
MCHC RBC-ENTMCNC: 31.8 % — LOW (ref 32–36)
MCV RBC AUTO: 92.8 FL — SIGNIFICANT CHANGE UP (ref 80–100)
PLATELET # BLD AUTO: 234 K/UL — SIGNIFICANT CHANGE UP (ref 150–400)
PMV BLD: 9.6 FL — SIGNIFICANT CHANGE UP (ref 7–13)
POTASSIUM SERPL-MCNC: 4.8 MMOL/L — SIGNIFICANT CHANGE UP (ref 3.5–5.3)
POTASSIUM SERPL-SCNC: 4.8 MMOL/L — SIGNIFICANT CHANGE UP (ref 3.5–5.3)
RBC # BLD: 3.35 M/UL — LOW (ref 3.8–5.2)
RBC # FLD: 14 % — SIGNIFICANT CHANGE UP (ref 10.3–14.5)
SODIUM SERPL-SCNC: 142 MMOL/L — SIGNIFICANT CHANGE UP (ref 135–145)
VANCOMYCIN TROUGH SERPL-MCNC: 14.4 UG/ML — SIGNIFICANT CHANGE UP (ref 10–20)
WBC # BLD: 5.79 K/UL — SIGNIFICANT CHANGE UP (ref 3.8–10.5)
WBC # FLD AUTO: 5.79 K/UL — SIGNIFICANT CHANGE UP (ref 3.8–10.5)

## 2017-01-16 RX ADMIN — ENOXAPARIN SODIUM 40 MILLIGRAM(S): 100 INJECTION SUBCUTANEOUS at 21:24

## 2017-01-16 RX ADMIN — Medication 1 TABLET(S): at 11:17

## 2017-01-16 RX ADMIN — Medication 100 MILLIGRAM(S): at 06:43

## 2017-01-16 RX ADMIN — Medication 10 MILLILITER(S): at 13:26

## 2017-01-16 RX ADMIN — SIMETHICONE 80 MILLIGRAM(S): 80 TABLET, CHEWABLE ORAL at 06:45

## 2017-01-16 RX ADMIN — SIMETHICONE 80 MILLIGRAM(S): 80 TABLET, CHEWABLE ORAL at 13:26

## 2017-01-16 RX ADMIN — ASENAPINE MALEATE 5 MILLIGRAM(S): 10 TABLET SUBLINGUAL at 06:42

## 2017-01-16 RX ADMIN — PANTOPRAZOLE SODIUM 40 MILLIGRAM(S): 20 TABLET, DELAYED RELEASE ORAL at 06:43

## 2017-01-16 RX ADMIN — SIMETHICONE 80 MILLIGRAM(S): 80 TABLET, CHEWABLE ORAL at 21:19

## 2017-01-16 RX ADMIN — Medication 2 MILLIGRAM(S): at 06:43

## 2017-01-16 RX ADMIN — Medication 5 MILLIGRAM(S): at 06:43

## 2017-01-16 RX ADMIN — Medication 1 MILLIGRAM(S): at 11:17

## 2017-01-16 RX ADMIN — Medication 3 MILLIGRAM(S): at 21:19

## 2017-01-16 RX ADMIN — Medication 100 MILLIGRAM(S): at 21:19

## 2017-01-16 RX ADMIN — Medication 10 MILLILITER(S): at 21:18

## 2017-01-16 RX ADMIN — Medication 2 MILLIGRAM(S): at 18:03

## 2017-01-16 RX ADMIN — Medication 166.67 MILLIGRAM(S): at 21:23

## 2017-01-16 RX ADMIN — Medication 166.67 MILLIGRAM(S): at 09:40

## 2017-01-16 RX ADMIN — Medication 100 MILLIGRAM(S): at 13:26

## 2017-01-16 RX ADMIN — Medication 10 MILLILITER(S): at 06:42

## 2017-01-16 RX ADMIN — DIVALPROEX SODIUM 1000 MILLIGRAM(S): 500 TABLET, DELAYED RELEASE ORAL at 13:26

## 2017-01-16 RX ADMIN — DIVALPROEX SODIUM 1250 MILLIGRAM(S): 500 TABLET, DELAYED RELEASE ORAL at 21:19

## 2017-01-16 RX ADMIN — Medication 5 MILLIGRAM(S): at 16:11

## 2017-01-16 RX ADMIN — Medication 100 MILLIGRAM(S): at 23:06

## 2017-01-16 RX ADMIN — Medication 200 MILLIGRAM(S): at 11:17

## 2017-01-16 RX ADMIN — Medication 5 MILLIGRAM(S): at 11:17

## 2017-01-16 NOTE — DISCHARGE NOTE ADULT - MEDICATION SUMMARY - MEDICATIONS TO TAKE
I will START or STAY ON the medications listed below when I get home from the hospital:    acetaminophen 325 mg oral tablet  -- 2 tab(s) by mouth every 6 hours, As needed, For Temp greater than 38 C (100.4 F)  -- Indication: For Fever    acetaminophen 325 mg oral tablet  -- 2 tab(s) by mouth every 6 hours, As needed, mild-moderate  -- Indication: For Pain     Milk of Magnesia 8% oral suspension  -- 30 milliliter(s) by mouth 3 times a week  -- Indication: For Constipation     Mylanta oral suspension  -- 10 milliliter(s) by mouth 3 times  -- Indication: For GERD    clonazePAM 2 mg oral tablet  -- 1 tab(s) by mouth 2 times a day  -- Indication: For Mental retardation, profound (I.Q. < 20)    divalproex sodium 250 mg oral delayed release tablet  -- 1 tab(s) by mouth once a day (at bedtime); (together with 1000mg for total of 1250mg)   -- Indication: For Mental retardation, profound (I.Q. < 20)    divalproex sodium 500 mg oral delayed release tablet  -- 2 tablets by mouth in the morning and in the evening   -- Indication: For Mental retardation, profound (I.Q. < 20)    traZODone 100 mg oral tablet  -- 1 tab(s) by mouth once a day (at bedtime)  -- Indication: For Mental retardation, profound (I.Q. < 20)    Reglan 5 mg oral tablet  -- 1 tab(s) by mouth 3 times a day  -- Indication: For Nausea    trihexyphenidyl 2 mg oral tablet  -- 0.5 tab(s) by mouth once a day (in the morning)  -- Indication: For Mental retardation, profound (I.Q. < 20)    OLANZapine 5 mg oral tablet  -- 1 tab(s) by mouth once a day, As Needed clinic visit  -- Indication: For Mental retardation, profound (I.Q. < 20)    Saphris Black Lara 5 mg sublingual tablet  -- 1 tab(s) under tongue once a day (in the morning)  -- Indication: For Mental retardation, profound (I.Q. < 20)    docusate potassium 100 mg oral capsule  -- 2  by mouth once a day (at bedtime)  -- Indication: For Constipation     polyethylene glycol 3350 oral powder for reconstitution  -- 17 gram(s) by mouth once a day (at bedtime)  -- Indication: For Constipation     clindamycin  -- 450 milligram(s) by mouth every 8 hours  -- Indication: For Cellulitis of right lower extremity    simethicone 80 mg oral tablet  -- 1 tab(s) by mouth 3 times a day (after meals)  -- Indication: For GERD    melatonin 3 mg oral tablet  -- 2 tab(s) by mouth once (at bedtime)  -- Indication: For Insomnia    Bacid (LAC) oral capsule  -- 1 cap(s) by mouth once a day  -- Indication: For Probiotics    pantoprazole 40 mg oral delayed release tablet  -- 1 tab(s) by mouth once a day  -- Indication: For GERD    Citrus Calcium with Vitamin D 200 mg-250 intl units oral tablet  -- 2 tab(s) by mouth once a day  -- Indication: For sUPPLEMENT     Multiple Vitamins oral tablet  -- 1 tab(s) by mouth once a day  -- Indication: For Supplement     folic acid 1 mg oral tablet  -- 1 tab(s) by mouth once a day  -- Indication: For sUPPLEMENT

## 2017-01-16 NOTE — DISCHARGE NOTE ADULT - HOSPITAL COURSE
47 y/o F with profound MR (nonverbal at baseline.) GERD with Garcia's esophagus, presents from [facility] redness and induration on R thigh, treating for cellulitis    Hospital course: 45 y/o F with profound MR (nonverbal at baseline.) GERD with Garcia's esophagus, presents from [facility] redness and induration on R thigh, treating for cellulitis    Hospital course:    Cellulitis of right lower extremity.  - Clinda IV  - Bcx -> 1 bottle positive   - ID Dr Horton   -Abx changed to  Vanc/Cefazolin     Diarrhea  -1/15 diarrhea  -cdiff negative    Mental retardation, profound (I.Q. < 20).  - continue depakote.     Garcia's esophagus with dysplasia.  - protonix.     Diarrhea  -Resolved     Need for prophylactic measure.  -Lovenox.         Pt medically stable for discharge to [facility] w/ 6 more days of Clinda PO

## 2017-01-16 NOTE — PROVIDER CONTACT NOTE (OTHER) - RECOMMENDATIONS
MD Cesar notified with orders for Vanco trough/Stool for cdiff/ recommends tylenol PO/ blood cx ordered in the am
Continue to monitor

## 2017-01-16 NOTE — DISCHARGE NOTE ADULT - PLAN OF CARE
resolution of swelling and infection Continue with antibiotics as prescribed. Follow up with provider in 1 week. continue with depakote Resolution of swelling and infection Continue and complete your entire course of antibiotics as prescribed. [facility] 1 week. Continue with Depakote as directed

## 2017-01-16 NOTE — DISCHARGE NOTE ADULT - CARE PLAN
Principal Discharge DX:	Cellulitis of right lower extremity  Goal:	resolution of swelling and infection  Instructions for follow-up, activity and diet:	Continue with antibiotics as prescribed. Follow up with provider in 1 week.  Secondary Diagnosis:	Mental retardation, profound (I.Q. < 20)  Instructions for follow-up, activity and diet:	continue with depakote Principal Discharge DX:	Cellulitis of right lower extremity  Goal:	Resolution of swelling and infection  Instructions for follow-up, activity and diet:	Continue and complete your entire course of antibiotics as prescribed. Follow up with the [facility] medical care providers in 1 week.  Secondary Diagnosis:	Mental retardation, profound (I.Q. < 20)  Instructions for follow-up, activity and diet:	Continue with Depakote as directed

## 2017-01-16 NOTE — DISCHARGE NOTE ADULT - PATIENT PORTAL LINK FT
“You can access the FollowHealth Patient Portal, offered by [facility], by registering with the following website: [email or web address]”

## 2017-01-17 VITALS
HEART RATE: 113 BPM | DIASTOLIC BLOOD PRESSURE: 76 MMHG | TEMPERATURE: 99 F | SYSTOLIC BLOOD PRESSURE: 112 MMHG | OXYGEN SATURATION: 100 % | RESPIRATION RATE: 18 BRPM

## 2017-01-17 LAB
BUN SERPL-MCNC: 21 MG/DL — SIGNIFICANT CHANGE UP (ref 7–23)
CALCIUM SERPL-MCNC: 9.1 MG/DL — SIGNIFICANT CHANGE UP (ref 8.4–10.5)
CHLORIDE SERPL-SCNC: 100 MMOL/L — SIGNIFICANT CHANGE UP (ref 98–107)
CO2 SERPL-SCNC: 30 MMOL/L — SIGNIFICANT CHANGE UP (ref 22–31)
CREAT SERPL-MCNC: 0.86 MG/DL — SIGNIFICANT CHANGE UP (ref 0.5–1.3)
GLUCOSE SERPL-MCNC: 98 MG/DL — SIGNIFICANT CHANGE UP (ref 70–99)
HCT VFR BLD CALC: 33.3 % — LOW (ref 34.5–45)
HGB BLD-MCNC: 10.5 G/DL — LOW (ref 11.5–15.5)
MCHC RBC-ENTMCNC: 29.6 PG — SIGNIFICANT CHANGE UP (ref 27–34)
MCHC RBC-ENTMCNC: 31.5 % — LOW (ref 32–36)
MCV RBC AUTO: 93.8 FL — SIGNIFICANT CHANGE UP (ref 80–100)
PLATELET # BLD AUTO: 274 K/UL — SIGNIFICANT CHANGE UP (ref 150–400)
PMV BLD: 9.8 FL — SIGNIFICANT CHANGE UP (ref 7–13)
POTASSIUM SERPL-MCNC: 5 MMOL/L — SIGNIFICANT CHANGE UP (ref 3.5–5.3)
POTASSIUM SERPL-SCNC: 5 MMOL/L — SIGNIFICANT CHANGE UP (ref 3.5–5.3)
RBC # BLD: 3.55 M/UL — LOW (ref 3.8–5.2)
RBC # FLD: 14 % — SIGNIFICANT CHANGE UP (ref 10.3–14.5)
SODIUM SERPL-SCNC: 141 MMOL/L — SIGNIFICANT CHANGE UP (ref 135–145)
SPECIMEN SOURCE: SIGNIFICANT CHANGE UP
SPECIMEN SOURCE: SIGNIFICANT CHANGE UP
WBC # BLD: 9.38 K/UL — SIGNIFICANT CHANGE UP (ref 3.8–10.5)
WBC # FLD AUTO: 9.38 K/UL — SIGNIFICANT CHANGE UP (ref 3.8–10.5)

## 2017-01-17 RX ORDER — LACTOBACILLUS ACIDOPHILUS 100MM CELL
1 CAPSULE ORAL
Qty: 0 | Refills: 0 | COMMUNITY

## 2017-01-17 RX ORDER — ACETAMINOPHEN 500 MG
2 TABLET ORAL
Qty: 0 | Refills: 0 | COMMUNITY
Start: 2017-01-17

## 2017-01-17 RX ADMIN — DIVALPROEX SODIUM 1000 MILLIGRAM(S): 500 TABLET, DELAYED RELEASE ORAL at 12:34

## 2017-01-17 RX ADMIN — ASENAPINE MALEATE 5 MILLIGRAM(S): 10 TABLET SUBLINGUAL at 07:14

## 2017-01-17 RX ADMIN — Medication 166.67 MILLIGRAM(S): at 08:43

## 2017-01-17 RX ADMIN — Medication 5 MILLIGRAM(S): at 07:14

## 2017-01-17 RX ADMIN — Medication 1 MILLIGRAM(S): at 12:33

## 2017-01-17 RX ADMIN — Medication 5 MILLIGRAM(S): at 12:34

## 2017-01-17 RX ADMIN — Medication 10 MILLILITER(S): at 13:05

## 2017-01-17 RX ADMIN — Medication 2 MILLIGRAM(S): at 07:14

## 2017-01-17 RX ADMIN — Medication 10 MILLILITER(S): at 07:14

## 2017-01-17 RX ADMIN — Medication 200 MILLIGRAM(S): at 12:34

## 2017-01-17 RX ADMIN — SIMETHICONE 80 MILLIGRAM(S): 80 TABLET, CHEWABLE ORAL at 13:05

## 2017-01-17 RX ADMIN — SIMETHICONE 80 MILLIGRAM(S): 80 TABLET, CHEWABLE ORAL at 07:15

## 2017-01-17 RX ADMIN — Medication 1 TABLET(S): at 12:33

## 2017-01-17 RX ADMIN — Medication 100 MILLIGRAM(S): at 13:05

## 2017-01-17 RX ADMIN — Medication 1 MILLIGRAM(S): at 12:34

## 2017-01-17 RX ADMIN — Medication 100 MILLIGRAM(S): at 06:02

## 2017-01-17 RX ADMIN — PANTOPRAZOLE SODIUM 40 MILLIGRAM(S): 20 TABLET, DELAYED RELEASE ORAL at 07:14

## 2017-01-17 RX ADMIN — Medication 1 TABLET(S): at 12:34

## 2017-01-18 ENCOUNTER — INPATIENT (INPATIENT)
Facility: HOSPITAL | Age: 47
LOS: 1 days | Discharge: OTHER INSTITUTION | End: 2017-01-20
Attending: INTERNAL MEDICINE | Admitting: INTERNAL MEDICINE
Payer: MEDICARE

## 2017-01-18 VITALS
HEART RATE: 107 BPM | OXYGEN SATURATION: 95 % | SYSTOLIC BLOOD PRESSURE: 94 MMHG | RESPIRATION RATE: 16 BRPM | DIASTOLIC BLOOD PRESSURE: 63 MMHG | TEMPERATURE: 98 F

## 2017-01-18 DIAGNOSIS — N39.0 URINARY TRACT INFECTION, SITE NOT SPECIFIED: ICD-10-CM

## 2017-01-18 LAB
ALBUMIN SERPL ELPH-MCNC: 2.6 G/DL — LOW (ref 3.3–5)
ALP SERPL-CCNC: 64 U/L — SIGNIFICANT CHANGE UP (ref 40–120)
ALT FLD-CCNC: 20 U/L — SIGNIFICANT CHANGE UP (ref 4–33)
APPEARANCE UR: SIGNIFICANT CHANGE UP
AST SERPL-CCNC: 33 U/L — HIGH (ref 4–32)
B PERT DNA SPEC QL NAA+PROBE: SIGNIFICANT CHANGE UP
BACTERIA # UR AUTO: HIGH
BASE EXCESS BLDV CALC-SCNC: 10.1 MMOL/L — SIGNIFICANT CHANGE UP
BASOPHILS # BLD AUTO: 0.04 K/UL — SIGNIFICANT CHANGE UP (ref 0–0.2)
BASOPHILS NFR BLD AUTO: 0.4 % — SIGNIFICANT CHANGE UP (ref 0–2)
BILIRUB SERPL-MCNC: 0.2 MG/DL — SIGNIFICANT CHANGE UP (ref 0.2–1.2)
BILIRUB UR-MCNC: NEGATIVE — SIGNIFICANT CHANGE UP
BLOOD GAS VENOUS - CREATININE: 0.73 MG/DL — SIGNIFICANT CHANGE UP (ref 0.5–1.3)
BLOOD UR QL VISUAL: HIGH
BUN SERPL-MCNC: 21 MG/DL — SIGNIFICANT CHANGE UP (ref 7–23)
C PNEUM DNA SPEC QL NAA+PROBE: NOT DETECTED — SIGNIFICANT CHANGE UP
CALCIUM SERPL-MCNC: 8.9 MG/DL — SIGNIFICANT CHANGE UP (ref 8.4–10.5)
CHLORIDE BLDV-SCNC: 105 MMOL/L — SIGNIFICANT CHANGE UP (ref 96–108)
CHLORIDE SERPL-SCNC: 103 MMOL/L — SIGNIFICANT CHANGE UP (ref 98–107)
CO2 SERPL-SCNC: 31 MMOL/L — SIGNIFICANT CHANGE UP (ref 22–31)
COLOR SPEC: YELLOW — SIGNIFICANT CHANGE UP
CREAT SERPL-MCNC: 0.67 MG/DL — SIGNIFICANT CHANGE UP (ref 0.5–1.3)
EOSINOPHIL # BLD AUTO: 0.17 K/UL — SIGNIFICANT CHANGE UP (ref 0–0.5)
EOSINOPHIL NFR BLD AUTO: 1.8 % — SIGNIFICANT CHANGE UP (ref 0–6)
FLUAV H1 2009 PAND RNA SPEC QL NAA+PROBE: NOT DETECTED — SIGNIFICANT CHANGE UP
FLUAV H1 RNA SPEC QL NAA+PROBE: NOT DETECTED — SIGNIFICANT CHANGE UP
FLUAV H3 RNA SPEC QL NAA+PROBE: NOT DETECTED — SIGNIFICANT CHANGE UP
FLUAV SUBTYP SPEC NAA+PROBE: SIGNIFICANT CHANGE UP
FLUBV RNA SPEC QL NAA+PROBE: NOT DETECTED — SIGNIFICANT CHANGE UP
GAS PNL BLDV: 137 MMOL/L — SIGNIFICANT CHANGE UP (ref 136–146)
GLUCOSE BLDV-MCNC: 90 — SIGNIFICANT CHANGE UP (ref 70–99)
GLUCOSE SERPL-MCNC: 95 MG/DL — SIGNIFICANT CHANGE UP (ref 70–99)
GLUCOSE UR-MCNC: NEGATIVE — SIGNIFICANT CHANGE UP
HADV DNA SPEC QL NAA+PROBE: NOT DETECTED — SIGNIFICANT CHANGE UP
HCO3 BLDV-SCNC: 32 MMOL/L — HIGH (ref 20–27)
HCOV 229E RNA SPEC QL NAA+PROBE: NOT DETECTED — SIGNIFICANT CHANGE UP
HCOV HKU1 RNA SPEC QL NAA+PROBE: NOT DETECTED — SIGNIFICANT CHANGE UP
HCOV NL63 RNA SPEC QL NAA+PROBE: NOT DETECTED — SIGNIFICANT CHANGE UP
HCOV OC43 RNA SPEC QL NAA+PROBE: NOT DETECTED — SIGNIFICANT CHANGE UP
HCT VFR BLD CALC: 32.2 % — LOW (ref 34.5–45)
HCT VFR BLDV CALC: 26.9 % — LOW (ref 34.5–45)
HGB BLD-MCNC: 9.9 G/DL — LOW (ref 11.5–15.5)
HGB BLDV-MCNC: 8.7 G/DL — LOW (ref 11.5–15.5)
HMPV RNA SPEC QL NAA+PROBE: NOT DETECTED — SIGNIFICANT CHANGE UP
HPIV1 RNA SPEC QL NAA+PROBE: NOT DETECTED — SIGNIFICANT CHANGE UP
HPIV2 RNA SPEC QL NAA+PROBE: NOT DETECTED — SIGNIFICANT CHANGE UP
HPIV3 RNA SPEC QL NAA+PROBE: NOT DETECTED — SIGNIFICANT CHANGE UP
HPIV4 RNA SPEC QL NAA+PROBE: NOT DETECTED — SIGNIFICANT CHANGE UP
IMM GRANULOCYTES NFR BLD AUTO: 0.7 % — SIGNIFICANT CHANGE UP (ref 0–1.5)
KETONES UR-MCNC: NEGATIVE — SIGNIFICANT CHANGE UP
LACTATE BLDV-MCNC: 1.6 MMOL/L — SIGNIFICANT CHANGE UP (ref 0.5–2)
LEUKOCYTE ESTERASE UR-ACNC: HIGH
LYMPHOCYTES # BLD AUTO: 2.35 K/UL — SIGNIFICANT CHANGE UP (ref 1–3.3)
LYMPHOCYTES # BLD AUTO: 25.5 % — SIGNIFICANT CHANGE UP (ref 13–44)
M PNEUMO DNA SPEC QL NAA+PROBE: NOT DETECTED — SIGNIFICANT CHANGE UP
MAGNESIUM SERPL-MCNC: 2.2 MG/DL — SIGNIFICANT CHANGE UP (ref 1.6–2.6)
MCHC RBC-ENTMCNC: 29.1 PG — SIGNIFICANT CHANGE UP (ref 27–34)
MCHC RBC-ENTMCNC: 30.7 % — LOW (ref 32–36)
MCV RBC AUTO: 94.7 FL — SIGNIFICANT CHANGE UP (ref 80–100)
MONOCYTES # BLD AUTO: 1.03 K/UL — HIGH (ref 0–0.9)
MONOCYTES NFR BLD AUTO: 11.2 % — SIGNIFICANT CHANGE UP (ref 2–14)
MUCOUS THREADS # UR AUTO: SIGNIFICANT CHANGE UP
NEUTROPHILS # BLD AUTO: 5.56 K/UL — SIGNIFICANT CHANGE UP (ref 1.8–7.4)
NEUTROPHILS NFR BLD AUTO: 60.4 % — SIGNIFICANT CHANGE UP (ref 43–77)
NITRITE UR-MCNC: NEGATIVE — SIGNIFICANT CHANGE UP
NON-SQ EPI CELLS # UR AUTO: 3 — SIGNIFICANT CHANGE UP
PCO2 BLDV: 59 MMHG — HIGH (ref 41–51)
PH BLDV: 7.39 PH — SIGNIFICANT CHANGE UP (ref 7.32–7.43)
PH UR: 6.5 — SIGNIFICANT CHANGE UP (ref 4.6–8)
PHOSPHATE SERPL-MCNC: 3.8 MG/DL — SIGNIFICANT CHANGE UP (ref 2.5–4.5)
PLATELET # BLD AUTO: 297 K/UL — SIGNIFICANT CHANGE UP (ref 150–400)
PMV BLD: 9.6 FL — SIGNIFICANT CHANGE UP (ref 7–13)
PO2 BLDV: 27 MMHG — LOW (ref 35–40)
POTASSIUM BLDV-SCNC: 4.4 MMOL/L — SIGNIFICANT CHANGE UP (ref 3.4–4.5)
POTASSIUM SERPL-MCNC: 4.2 MMOL/L — SIGNIFICANT CHANGE UP (ref 3.5–5.3)
POTASSIUM SERPL-SCNC: 4.2 MMOL/L — SIGNIFICANT CHANGE UP (ref 3.5–5.3)
PROT SERPL-MCNC: 5.7 G/DL — LOW (ref 6–8.3)
PROT UR-MCNC: 150 — HIGH
RBC # BLD: 3.4 M/UL — LOW (ref 3.8–5.2)
RBC # FLD: 14.3 % — SIGNIFICANT CHANGE UP (ref 10.3–14.5)
RBC CASTS # UR COMP ASSIST: SIGNIFICANT CHANGE UP (ref 0–?)
RSV RNA SPEC QL NAA+PROBE: NOT DETECTED — SIGNIFICANT CHANGE UP
RV+EV RNA SPEC QL NAA+PROBE: NOT DETECTED — SIGNIFICANT CHANGE UP
SAO2 % BLDV: 42.9 % — LOW (ref 60–85)
SODIUM SERPL-SCNC: 143 MMOL/L — SIGNIFICANT CHANGE UP (ref 135–145)
SP GR SPEC: 1.02 — SIGNIFICANT CHANGE UP (ref 1–1.03)
SQUAMOUS # UR AUTO: SIGNIFICANT CHANGE UP
TSH SERPL-MCNC: 4.75 UIU/ML — HIGH (ref 0.27–4.2)
UROBILINOGEN FLD QL: NORMAL E.U. — SIGNIFICANT CHANGE UP (ref 0.1–0.2)
WBC # BLD: 9.21 K/UL — SIGNIFICANT CHANGE UP (ref 3.8–10.5)
WBC # FLD AUTO: 9.21 K/UL — SIGNIFICANT CHANGE UP (ref 3.8–10.5)
WBC CLUMPS #/AREA URNS HPF: PRESENT — HIGH (ref 0–?)
WBC UR QL: >50 — HIGH (ref 0–?)
YEAST BUDDING # UR COMP ASSIST: SIGNIFICANT CHANGE UP

## 2017-01-18 PROCEDURE — 71010: CPT | Mod: 26

## 2017-01-18 PROCEDURE — 99223 1ST HOSP IP/OBS HIGH 75: CPT

## 2017-01-18 RX ORDER — OLANZAPINE 15 MG/1
1 TABLET, FILM COATED ORAL
Qty: 0 | Refills: 0 | COMMUNITY

## 2017-01-18 RX ORDER — DOCUSATE SODIUM 100 MG
2 CAPSULE ORAL
Qty: 0 | Refills: 0 | COMMUNITY

## 2017-01-18 RX ORDER — SODIUM CHLORIDE 9 MG/ML
1000 INJECTION INTRAMUSCULAR; INTRAVENOUS; SUBCUTANEOUS ONCE
Qty: 0 | Refills: 0 | Status: COMPLETED | OUTPATIENT
Start: 2017-01-18 | End: 2017-01-18

## 2017-01-18 RX ORDER — LACTOBACILLUS ACIDOPHILUS 100MM CELL
1 CAPSULE ORAL
Qty: 0 | Refills: 0 | COMMUNITY

## 2017-01-18 RX ORDER — DIVALPROEX SODIUM 500 MG/1
1 TABLET, DELAYED RELEASE ORAL
Qty: 0 | Refills: 0 | COMMUNITY

## 2017-01-18 RX ORDER — MAGNESIUM HYDROXIDE 400 MG/1
30 TABLET, CHEWABLE ORAL
Qty: 0 | Refills: 0 | COMMUNITY

## 2017-01-18 RX ORDER — CEFTRIAXONE 500 MG/1
1 INJECTION, POWDER, FOR SOLUTION INTRAMUSCULAR; INTRAVENOUS ONCE
Qty: 0 | Refills: 0 | Status: COMPLETED | OUTPATIENT
Start: 2017-01-18 | End: 2017-01-18

## 2017-01-18 RX ORDER — FOLIC ACID 0.8 MG
1 TABLET ORAL
Qty: 0 | Refills: 0 | COMMUNITY

## 2017-01-18 RX ORDER — ENOXAPARIN SODIUM 100 MG/ML
40 INJECTION SUBCUTANEOUS EVERY 24 HOURS
Qty: 0 | Refills: 0 | Status: DISCONTINUED | OUTPATIENT
Start: 2017-01-18 | End: 2017-01-20

## 2017-01-18 RX ORDER — TRIHEXYPHENIDYL HCL 2 MG
0.5 TABLET ORAL
Qty: 0 | Refills: 0 | COMMUNITY

## 2017-01-18 RX ORDER — ASENAPINE MALEATE 10 MG/1
1 TABLET SUBLINGUAL
Qty: 0 | Refills: 0 | COMMUNITY

## 2017-01-18 RX ORDER — DOCUSATE SODIUM 100 MG
1 CAPSULE ORAL
Qty: 0 | Refills: 0 | COMMUNITY

## 2017-01-18 RX ORDER — SODIUM CHLORIDE 9 MG/ML
1000 INJECTION INTRAMUSCULAR; INTRAVENOUS; SUBCUTANEOUS
Qty: 0 | Refills: 0 | Status: DISCONTINUED | OUTPATIENT
Start: 2017-01-18 | End: 2017-01-20

## 2017-01-18 RX ORDER — DIVALPROEX SODIUM 500 MG/1
2 TABLET, DELAYED RELEASE ORAL
Qty: 0 | Refills: 0 | COMMUNITY

## 2017-01-18 RX ORDER — ZINC OXIDE 200 MG/G
0 OINTMENT TOPICAL
Qty: 0 | Refills: 0 | COMMUNITY

## 2017-01-18 RX ORDER — POLYETHYLENE GLYCOL 3350 17 G/17G
17 POWDER, FOR SOLUTION ORAL
Qty: 0 | Refills: 0 | COMMUNITY

## 2017-01-18 RX ORDER — LANOLIN ALCOHOL/MO/W.PET/CERES
2 CREAM (GRAM) TOPICAL
Qty: 0 | Refills: 0 | COMMUNITY

## 2017-01-18 RX ORDER — TRAZODONE HCL 50 MG
1 TABLET ORAL
Qty: 0 | Refills: 0 | COMMUNITY

## 2017-01-18 RX ORDER — SIMETHICONE 80 MG/1
1 TABLET, CHEWABLE ORAL
Qty: 0 | Refills: 0 | COMMUNITY

## 2017-01-18 RX ORDER — PANTOPRAZOLE SODIUM 20 MG/1
1 TABLET, DELAYED RELEASE ORAL
Qty: 0 | Refills: 0 | COMMUNITY

## 2017-01-18 RX ADMIN — SODIUM CHLORIDE 2000 MILLILITER(S): 9 INJECTION INTRAMUSCULAR; INTRAVENOUS; SUBCUTANEOUS at 20:26

## 2017-01-18 RX ADMIN — Medication 100 MILLIGRAM(S): at 22:17

## 2017-01-18 RX ADMIN — SODIUM CHLORIDE 1000 MILLILITER(S): 9 INJECTION INTRAMUSCULAR; INTRAVENOUS; SUBCUTANEOUS at 23:03

## 2017-01-18 RX ADMIN — CEFTRIAXONE 100 GRAM(S): 500 INJECTION, POWDER, FOR SOLUTION INTRAMUSCULAR; INTRAVENOUS at 20:51

## 2017-01-18 NOTE — H&P ADULT. - PROBLEM SELECTOR PLAN 1
- patient with lethargy from living facility  - nitrite negative UTI  - started on ceftriaxone; continued  - IVF - s/p 1 liter NS IVF bolus; additional bolus ordered, along with maintenance at 100 mL/Hr  - f/u urine culture

## 2017-01-18 NOTE — ED ADULT NURSE NOTE - OBJECTIVE STATEMENT
Patient received to room 7 awake, alert, nonverbal at baseline.  Patient aides from facility at bedside.  Patient normally walks but is refusing to walk at this time.  Patient recently discharged from [facility] with cellulitis infection.  Vitals recorded, hemodynamcially stable.  Safety maintained, will continue to monitor.

## 2017-01-18 NOTE — ED PROVIDER NOTE - MEDICAL DECISION MAKING DETAILS
46F non verbal MR p/w lethargy, tachycardia and hypotension. Concerning for dehydration vs sepsis. Will check CBC CMP RVP Blood Cx CXR. 1L NS. Will reassess.

## 2017-01-18 NOTE — ED PROVIDER NOTE - DIAGNOSIS COUNSELING, MDM
conducted a detailed discussion... I had a detailed discussion with the patient and/or guardian regarding the historical points, exam findings, and any diagnostic results supporting the admit diagnosis. I had a detailed discussion with the patient and guardian regarding the historical points, exam findings, and any diagnostic results supporting the admit diagnosis.

## 2017-01-18 NOTE — H&P ADULT. - ENMT COMMENTS
poor dentition; eroded teeth at upper jaw and lower jaw with unhealthy appearing teeth (h/o teeth grinding)

## 2017-01-18 NOTE — ED PROVIDER NOTE - PROGRESS NOTE DETAILS
BP improved to 100/70s. Pt still lethargic. UA via straight cath with large LE, >50WBCs. Given ceftriaxone and clindamycin. D/w Dr. Moulton who last admitted pt. Will admit to Dr. Moulton.

## 2017-01-18 NOTE — ED PROVIDER NOTE - PHYSICAL EXAMINATION
Pt alert, awake, does not follow commands at baseline, moving all extremities with contractures b/l UE.

## 2017-01-18 NOTE — H&P ADULT. - PROBLEM SELECTOR PLAN 2
- recently treated for same  - erythema resolved, per residency aides, but firmness, tenderness and area of involvement remains the same (per aides)  - patient refusing to bear weight on extremity and has RLE flexed, per staff  - discharged on clincamycin 450 mg BID  - prescribed clincamycin 900 mg IV in the ED; continued on clindaymcin 600 mg IV Q8H  - continued evaluation for any acute signs/symptoms

## 2017-01-18 NOTE — H&P ADULT. - PROBLEM SELECTOR PLAN 4
- BUN/Cr = 21/0.67 (ratio of 31:1)  - in the setting of UTI  - also contributing to presenting lethargy (now improved)  - hydration as mentioned above  - follow repeat lab-work,  intake/output for improvement

## 2017-01-18 NOTE — H&P ADULT. - ASSESSMENT
46 year old female, with PH significant for Mental retardation, Non-verbal status, GERD, Barretts esophagus, Constipation, Cellulitis (or right thigh), presented to the ED secondary to lethargy.  Diagnosed with Urinary Tract Infection.  Admitted for further management of:

## 2017-01-18 NOTE — ED PROVIDER NOTE - ATTENDING CONTRIBUTION TO CARE
DR. WALDEN, ATTENDING MD-  I performed a face to face bedside interview with patient regarding history of present illness, review of symptoms and past medical history. I completed an independent physical exam.  I have discussed patient's plan of care with the resident.   Documentation as above in the note.  HPI: 47 yo F with MR (non verbal at baseline, resident at [facility]), higginbotham's esophagus p/w lethargy today. was just discharged from hospital for RLE thigh cellulitis currently on clindamycin. Pt not able to communicate verbally, aide at bedside.   EXAM: Right inner thigh with induration but no erythema, not warm. lungs ctab, abd soft and non tender.   MDM: obtain infectious workup given history and presentation. UA, CXR.   UA shows UTI, straight cath to obtain urine. Will start IVAbx and admit.

## 2017-01-18 NOTE — H&P ADULT. - PROBLEM SELECTOR PLAN 3
- albumin = 2.6  - may be protein-calorie deficient  - consider nutrition consult  - currently on dysphagia diet - ?chiefly due to poor dentition  - consider any possibility of toothache/discomfort affecting patient's PO intake

## 2017-01-18 NOTE — ED ADULT TRIAGE NOTE - CHIEF COMPLAINT QUOTE
Pt discharged yesterday from facility for cellulitis. Presents today with fever, lethargy and refusing to walk. Non verbal at baseline.

## 2017-01-18 NOTE — ED PROVIDER NOTE - OBJECTIVE STATEMENT
46 F PMH MR (non verbal at baseline, resident at [facility]), higginbotham's esophagus p/w lethargy since this morning. [facility] employees at bedside report that she was discharged from the hospital yesterday for cellulitis. Today she has not been acting normally, being more lethargic and less vocal than usual. Pt typically ambulates but is unable to walk today 2/2 weakness. No fevers, chills, vomiting, diarrhea, 46 F Past Medical History MR (non verbal at baseline, resident at [facility]), higginbotham's esophagus p/w lethargy since this morning. [facility] employees at bedside report that she was discharged from the hospital yesterday for cellulitis. Today she has not been acting normally, being more lethargic and less vocal than usual. Pt typically ambulates but is unable to walk today 2/2 weakness. No fevers, chills, vomiting, diarrhea,

## 2017-01-18 NOTE — H&P ADULT. - PMH
Amenorrhea (ICD9 626.0)    Barretts esophagus    Constipation (ICD9 564.00)    GERD (Gastroesophageal Reflux Disease) (ICD9 530.81)    MR (Mental Retardation) (ICD9 319)    Nonverbal    Teeth grinding

## 2017-01-18 NOTE — H&P ADULT. - HISTORY OF PRESENT ILLNESS
46 year old female 46 year old female, with PH significant for Mental retardation, Non-verbal status, GERD, Barretts esophagus, Constipation, Cellulitis (or right thigh), presented to the ED secondary to lethargy 46 year old female, with PH significant for Mental retardation, Non-verbal status, GERD, Barretts esophagus, Constipation, Cellulitis (or right thigh), presented to the ED secondary to lethargy.  Seen and evaluated at bedside with 2 residency aides present; NAD.  Alert, tracts with eyes, but does follow commands; unable to contribute toward medical history.  Per reports, patient was discharged from the Hospital on the previous day after treatment for "right upper thigh swelling (cellulitis/abscess)."  She was noted to be febrile the next day, refused to walk and bear weight on the foot.  Right hip was externally rotated and kept in a flexed position.  Tylenol was prescribed for increased pain.  Patient was also on clindamycin 450 mg TID.  Facility aides noted fever in the AM and chills.  Aides also reported that right thigh swelling no longer demonstrated erythema as prior to previous admission; swelling, however, continued.  No reports of coughing, rhinorrhea, SOB, diarrhea, rash or any other associated signs symptoms.  Patient was transferred back to [facility] for evaluation/management.    Vital signs upon ED presentation as follows: BP = 94/63 (to 115/86), HR = 107, RR = 16, T = 36.7 C (98.1 F), O2 Sat = 95 to 98% on RA.  Urinalysis indicative of infection.  Right upper, inner thigh still with swelling.  Diagnosed with Urinary Tract Infection.  Prescribed ceftriaxone 1 gram IV, clindamycin 900 mg IV and NaCl 1 liter bolus.

## 2017-01-19 DIAGNOSIS — E46 UNSPECIFIED PROTEIN-CALORIE MALNUTRITION: ICD-10-CM

## 2017-01-19 DIAGNOSIS — E86.0 DEHYDRATION: ICD-10-CM

## 2017-01-19 DIAGNOSIS — Z41.8 ENCOUNTER FOR OTHER PROCEDURES FOR PURPOSES OTHER THAN REMEDYING HEALTH STATE: ICD-10-CM

## 2017-01-19 DIAGNOSIS — L03.115 CELLULITIS OF RIGHT LOWER LIMB: ICD-10-CM

## 2017-01-19 DIAGNOSIS — N39.0 URINARY TRACT INFECTION, SITE NOT SPECIFIED: ICD-10-CM

## 2017-01-19 DIAGNOSIS — R63.8 OTHER SYMPTOMS AND SIGNS CONCERNING FOOD AND FLUID INTAKE: ICD-10-CM

## 2017-01-19 DIAGNOSIS — K21.9 GASTRO-ESOPHAGEAL REFLUX DISEASE WITHOUT ESOPHAGITIS: ICD-10-CM

## 2017-01-19 LAB
24R-OH-CALCIDIOL SERPL-MCNC: 46.6 NG/ML — SIGNIFICANT CHANGE UP (ref 30–100)
BACTERIA BLD CULT: SIGNIFICANT CHANGE UP
BACTERIA BLD CULT: SIGNIFICANT CHANGE UP
BASOPHILS # BLD AUTO: 0.02 K/UL — SIGNIFICANT CHANGE UP (ref 0–0.2)
BASOPHILS NFR BLD AUTO: 0.2 % — SIGNIFICANT CHANGE UP (ref 0–2)
BUN SERPL-MCNC: 14 MG/DL — SIGNIFICANT CHANGE UP (ref 7–23)
CALCIUM SERPL-MCNC: 8.7 MG/DL — SIGNIFICANT CHANGE UP (ref 8.4–10.5)
CHLORIDE SERPL-SCNC: 105 MMOL/L — SIGNIFICANT CHANGE UP (ref 98–107)
CO2 SERPL-SCNC: 26 MMOL/L — SIGNIFICANT CHANGE UP (ref 22–31)
CREAT SERPL-MCNC: 0.53 MG/DL — SIGNIFICANT CHANGE UP (ref 0.5–1.3)
EOSINOPHIL # BLD AUTO: 0.18 K/UL — SIGNIFICANT CHANGE UP (ref 0–0.5)
EOSINOPHIL NFR BLD AUTO: 2 % — SIGNIFICANT CHANGE UP (ref 0–6)
GLUCOSE SERPL-MCNC: 93 MG/DL — SIGNIFICANT CHANGE UP (ref 70–99)
HCT VFR BLD CALC: 31.1 % — LOW (ref 34.5–45)
HGB BLD-MCNC: 9.9 G/DL — LOW (ref 11.5–15.5)
IMM GRANULOCYTES NFR BLD AUTO: 0.5 % — SIGNIFICANT CHANGE UP (ref 0–1.5)
LYMPHOCYTES # BLD AUTO: 1.74 K/UL — SIGNIFICANT CHANGE UP (ref 1–3.3)
LYMPHOCYTES # BLD AUTO: 19.7 % — SIGNIFICANT CHANGE UP (ref 13–44)
MAGNESIUM SERPL-MCNC: 1.9 MG/DL — SIGNIFICANT CHANGE UP (ref 1.6–2.6)
MCHC RBC-ENTMCNC: 30 PG — SIGNIFICANT CHANGE UP (ref 27–34)
MCHC RBC-ENTMCNC: 31.8 % — LOW (ref 32–36)
MCV RBC AUTO: 94.2 FL — SIGNIFICANT CHANGE UP (ref 80–100)
MONOCYTES # BLD AUTO: 1.05 K/UL — HIGH (ref 0–0.9)
MONOCYTES NFR BLD AUTO: 11.9 % — SIGNIFICANT CHANGE UP (ref 2–14)
NEUTROPHILS # BLD AUTO: 5.79 K/UL — SIGNIFICANT CHANGE UP (ref 1.8–7.4)
NEUTROPHILS NFR BLD AUTO: 65.7 % — SIGNIFICANT CHANGE UP (ref 43–77)
PHOSPHATE SERPL-MCNC: 3 MG/DL — SIGNIFICANT CHANGE UP (ref 2.5–4.5)
PLATELET # BLD AUTO: 238 K/UL — SIGNIFICANT CHANGE UP (ref 150–400)
PMV BLD: 9.5 FL — SIGNIFICANT CHANGE UP (ref 7–13)
POTASSIUM SERPL-MCNC: 4.3 MMOL/L — SIGNIFICANT CHANGE UP (ref 3.5–5.3)
POTASSIUM SERPL-SCNC: 4.3 MMOL/L — SIGNIFICANT CHANGE UP (ref 3.5–5.3)
PREALB SERPL-MCNC: 13 MG/DL — LOW (ref 20–40)
RBC # BLD: 3.3 M/UL — LOW (ref 3.8–5.2)
RBC # FLD: 14.2 % — SIGNIFICANT CHANGE UP (ref 10.3–14.5)
SODIUM SERPL-SCNC: 143 MMOL/L — SIGNIFICANT CHANGE UP (ref 135–145)
SPECIMEN SOURCE: SIGNIFICANT CHANGE UP
SPECIMEN SOURCE: SIGNIFICANT CHANGE UP
VIT B12 SERPL-MCNC: 1028 PG/ML — HIGH (ref 200–900)
WBC # BLD: 8.82 K/UL — SIGNIFICANT CHANGE UP (ref 3.8–10.5)
WBC # FLD AUTO: 8.82 K/UL — SIGNIFICANT CHANGE UP (ref 3.8–10.5)

## 2017-01-19 RX ORDER — FOLIC ACID 0.8 MG
1 TABLET ORAL DAILY
Qty: 0 | Refills: 0 | Status: DISCONTINUED | OUTPATIENT
Start: 2017-01-19 | End: 2017-01-20

## 2017-01-19 RX ORDER — CLONAZEPAM 1 MG
1 TABLET ORAL
Qty: 0 | Refills: 0 | Status: DISCONTINUED | OUTPATIENT
Start: 2017-01-19 | End: 2017-01-20

## 2017-01-19 RX ORDER — DIVALPROEX SODIUM 500 MG/1
250 TABLET, DELAYED RELEASE ORAL AT BEDTIME
Qty: 0 | Refills: 0 | Status: DISCONTINUED | OUTPATIENT
Start: 2017-01-19 | End: 2017-01-20

## 2017-01-19 RX ORDER — SIMETHICONE 80 MG/1
80 TABLET, CHEWABLE ORAL EVERY 8 HOURS
Qty: 0 | Refills: 0 | Status: DISCONTINUED | OUTPATIENT
Start: 2017-01-19 | End: 2017-01-20

## 2017-01-19 RX ORDER — CLONAZEPAM 1 MG
1 TABLET ORAL
Qty: 0 | Refills: 0 | COMMUNITY

## 2017-01-19 RX ORDER — ASENAPINE MALEATE 10 MG/1
5 TABLET SUBLINGUAL DAILY
Qty: 0 | Refills: 0 | Status: DISCONTINUED | OUTPATIENT
Start: 2017-01-19 | End: 2017-01-20

## 2017-01-19 RX ORDER — OLANZAPINE 15 MG/1
5 TABLET, FILM COATED ORAL DAILY
Qty: 0 | Refills: 0 | Status: DISCONTINUED | OUTPATIENT
Start: 2017-01-19 | End: 2017-01-20

## 2017-01-19 RX ORDER — POLYETHYLENE GLYCOL 3350 17 G/17G
17 POWDER, FOR SOLUTION ORAL AT BEDTIME
Qty: 0 | Refills: 0 | Status: DISCONTINUED | OUTPATIENT
Start: 2017-01-19 | End: 2017-01-20

## 2017-01-19 RX ORDER — TRIHEXYPHENIDYL HCL 2 MG
1 TABLET ORAL DAILY
Qty: 0 | Refills: 0 | Status: DISCONTINUED | OUTPATIENT
Start: 2017-01-19 | End: 2017-01-20

## 2017-01-19 RX ORDER — DOCUSATE SODIUM 100 MG
200 CAPSULE ORAL AT BEDTIME
Qty: 0 | Refills: 0 | Status: DISCONTINUED | OUTPATIENT
Start: 2017-01-19 | End: 2017-01-20

## 2017-01-19 RX ORDER — DIVALPROEX SODIUM 500 MG/1
1000 TABLET, DELAYED RELEASE ORAL AT BEDTIME
Qty: 0 | Refills: 0 | Status: DISCONTINUED | OUTPATIENT
Start: 2017-01-19 | End: 2017-01-20

## 2017-01-19 RX ORDER — TRAZODONE HCL 50 MG
100 TABLET ORAL AT BEDTIME
Qty: 0 | Refills: 0 | Status: DISCONTINUED | OUTPATIENT
Start: 2017-01-19 | End: 2017-01-20

## 2017-01-19 RX ORDER — PANTOPRAZOLE SODIUM 20 MG/1
40 TABLET, DELAYED RELEASE ORAL
Qty: 0 | Refills: 0 | Status: DISCONTINUED | OUTPATIENT
Start: 2017-01-19 | End: 2017-01-20

## 2017-01-19 RX ORDER — MAGNESIUM HYDROXIDE 400 MG/1
30 TABLET, CHEWABLE ORAL
Qty: 0 | Refills: 0 | Status: DISCONTINUED | OUTPATIENT
Start: 2017-01-19 | End: 2017-01-20

## 2017-01-19 RX ORDER — ZINC OXIDE 200 MG/G
1 OINTMENT TOPICAL
Qty: 0 | Refills: 0 | Status: DISCONTINUED | OUTPATIENT
Start: 2017-01-19 | End: 2017-01-20

## 2017-01-19 RX ORDER — CEFTRIAXONE 500 MG/1
1 INJECTION, POWDER, FOR SOLUTION INTRAMUSCULAR; INTRAVENOUS EVERY 24 HOURS
Qty: 0 | Refills: 0 | Status: DISCONTINUED | OUTPATIENT
Start: 2017-01-19 | End: 2017-01-20

## 2017-01-19 RX ORDER — LACTOBACILLUS ACIDOPHILUS 100MM CELL
1 CAPSULE ORAL DAILY
Qty: 0 | Refills: 0 | Status: DISCONTINUED | OUTPATIENT
Start: 2017-01-19 | End: 2017-01-20

## 2017-01-19 RX ORDER — METOCLOPRAMIDE HCL 10 MG
5 TABLET ORAL THREE TIMES A DAY
Qty: 0 | Refills: 0 | Status: DISCONTINUED | OUTPATIENT
Start: 2017-01-19 | End: 2017-01-19

## 2017-01-19 RX ORDER — ASENAPINE MALEATE 10 MG/1
10 TABLET SUBLINGUAL AT BEDTIME
Qty: 0 | Refills: 0 | Status: DISCONTINUED | OUTPATIENT
Start: 2017-01-19 | End: 2017-01-20

## 2017-01-19 RX ADMIN — SIMETHICONE 80 MILLIGRAM(S): 80 TABLET, CHEWABLE ORAL at 06:11

## 2017-01-19 RX ADMIN — Medication 1 TABLET(S): at 14:27

## 2017-01-19 RX ADMIN — Medication 5 MILLIGRAM(S): at 14:30

## 2017-01-19 RX ADMIN — PANTOPRAZOLE SODIUM 40 MILLIGRAM(S): 20 TABLET, DELAYED RELEASE ORAL at 06:11

## 2017-01-19 RX ADMIN — ASENAPINE MALEATE 10 MILLIGRAM(S): 10 TABLET SUBLINGUAL at 23:51

## 2017-01-19 RX ADMIN — Medication 1 MILLIGRAM(S): at 18:06

## 2017-01-19 RX ADMIN — Medication 5 MILLIGRAM(S): at 09:03

## 2017-01-19 RX ADMIN — SIMETHICONE 80 MILLIGRAM(S): 80 TABLET, CHEWABLE ORAL at 23:51

## 2017-01-19 RX ADMIN — Medication 100 MILLIGRAM(S): at 22:50

## 2017-01-19 RX ADMIN — Medication 1 MILLIGRAM(S): at 06:10

## 2017-01-19 RX ADMIN — DIVALPROEX SODIUM 250 MILLIGRAM(S): 500 TABLET, DELAYED RELEASE ORAL at 23:51

## 2017-01-19 RX ADMIN — SIMETHICONE 80 MILLIGRAM(S): 80 TABLET, CHEWABLE ORAL at 14:27

## 2017-01-19 RX ADMIN — Medication 100 MILLIGRAM(S): at 23:51

## 2017-01-19 RX ADMIN — Medication 1 MILLIGRAM(S): at 14:28

## 2017-01-19 RX ADMIN — DIVALPROEX SODIUM 1000 MILLIGRAM(S): 500 TABLET, DELAYED RELEASE ORAL at 23:51

## 2017-01-19 RX ADMIN — ZINC OXIDE 1 APPLICATION(S): 200 OINTMENT TOPICAL at 06:11

## 2017-01-19 RX ADMIN — CEFTRIAXONE 100 GRAM(S): 500 INJECTION, POWDER, FOR SOLUTION INTRAMUSCULAR; INTRAVENOUS at 22:03

## 2017-01-19 RX ADMIN — SODIUM CHLORIDE 100 MILLILITER(S): 9 INJECTION INTRAMUSCULAR; INTRAVENOUS; SUBCUTANEOUS at 00:39

## 2017-01-19 RX ADMIN — ZINC OXIDE 1 APPLICATION(S): 200 OINTMENT TOPICAL at 18:07

## 2017-01-19 RX ADMIN — ENOXAPARIN SODIUM 40 MILLIGRAM(S): 100 INJECTION SUBCUTANEOUS at 06:11

## 2017-01-20 ENCOUNTER — TRANSCRIPTION ENCOUNTER (OUTPATIENT)
Age: 47
End: 2017-01-20

## 2017-01-20 VITALS
SYSTOLIC BLOOD PRESSURE: 99 MMHG | HEART RATE: 102 BPM | DIASTOLIC BLOOD PRESSURE: 59 MMHG | RESPIRATION RATE: 16 BRPM | TEMPERATURE: 98 F | OXYGEN SATURATION: 98 %

## 2017-01-20 LAB
BACTERIA UR CULT: SIGNIFICANT CHANGE UP
BUN SERPL-MCNC: 9 MG/DL — SIGNIFICANT CHANGE UP (ref 7–23)
CALCIUM SERPL-MCNC: 9 MG/DL — SIGNIFICANT CHANGE UP (ref 8.4–10.5)
CHLORIDE SERPL-SCNC: 100 MMOL/L — SIGNIFICANT CHANGE UP (ref 98–107)
CO2 SERPL-SCNC: 27 MMOL/L — SIGNIFICANT CHANGE UP (ref 22–31)
CREAT SERPL-MCNC: 0.51 MG/DL — SIGNIFICANT CHANGE UP (ref 0.5–1.3)
GLUCOSE SERPL-MCNC: 77 MG/DL — SIGNIFICANT CHANGE UP (ref 70–99)
HCT VFR BLD CALC: 29.8 % — LOW (ref 34.5–45)
HGB BLD-MCNC: 9.7 G/DL — LOW (ref 11.5–15.5)
MCHC RBC-ENTMCNC: 30 PG — SIGNIFICANT CHANGE UP (ref 27–34)
MCHC RBC-ENTMCNC: 32.6 % — SIGNIFICANT CHANGE UP (ref 32–36)
MCV RBC AUTO: 92.3 FL — SIGNIFICANT CHANGE UP (ref 80–100)
PLATELET # BLD AUTO: 269 K/UL — SIGNIFICANT CHANGE UP (ref 150–400)
PMV BLD: 9.4 FL — SIGNIFICANT CHANGE UP (ref 7–13)
POTASSIUM SERPL-MCNC: 4.1 MMOL/L — SIGNIFICANT CHANGE UP (ref 3.5–5.3)
POTASSIUM SERPL-SCNC: 4.1 MMOL/L — SIGNIFICANT CHANGE UP (ref 3.5–5.3)
PREALB SERPL-MCNC: 14 MG/DL — LOW (ref 20–40)
RBC # BLD: 3.23 M/UL — LOW (ref 3.8–5.2)
RBC # FLD: 13.8 % — SIGNIFICANT CHANGE UP (ref 10.3–14.5)
SODIUM SERPL-SCNC: 138 MMOL/L — SIGNIFICANT CHANGE UP (ref 135–145)
SPECIMEN SOURCE: SIGNIFICANT CHANGE UP
WBC # BLD: 6.87 K/UL — SIGNIFICANT CHANGE UP (ref 3.8–10.5)
WBC # FLD AUTO: 6.87 K/UL — SIGNIFICANT CHANGE UP (ref 3.8–10.5)

## 2017-01-20 RX ORDER — CEFDINIR 250 MG/5ML
1 POWDER, FOR SUSPENSION ORAL
Qty: 10 | Refills: 0 | OUTPATIENT
Start: 2017-01-20 | End: 2017-01-25

## 2017-01-20 RX ORDER — METOCLOPRAMIDE HCL 10 MG
1 TABLET ORAL
Qty: 0 | Refills: 0 | COMMUNITY

## 2017-01-20 RX ADMIN — Medication 1 MILLIGRAM(S): at 13:00

## 2017-01-20 RX ADMIN — ZINC OXIDE 1 APPLICATION(S): 200 OINTMENT TOPICAL at 06:03

## 2017-01-20 RX ADMIN — ENOXAPARIN SODIUM 40 MILLIGRAM(S): 100 INJECTION SUBCUTANEOUS at 06:02

## 2017-01-20 RX ADMIN — Medication 1 TABLET(S): at 13:00

## 2017-01-20 RX ADMIN — ASENAPINE MALEATE 5 MILLIGRAM(S): 10 TABLET SUBLINGUAL at 13:00

## 2017-01-20 RX ADMIN — Medication 100 MILLIGRAM(S): at 06:02

## 2017-01-20 RX ADMIN — SIMETHICONE 80 MILLIGRAM(S): 80 TABLET, CHEWABLE ORAL at 06:02

## 2017-01-20 RX ADMIN — Medication 1 MILLIGRAM(S): at 06:02

## 2017-01-20 RX ADMIN — Medication 100 MILLIGRAM(S): at 13:01

## 2017-01-20 RX ADMIN — SIMETHICONE 80 MILLIGRAM(S): 80 TABLET, CHEWABLE ORAL at 13:00

## 2017-01-20 RX ADMIN — PANTOPRAZOLE SODIUM 40 MILLIGRAM(S): 20 TABLET, DELAYED RELEASE ORAL at 06:12

## 2017-01-20 NOTE — DISCHARGE NOTE ADULT - HOSPITAL COURSE
46 year old female, with PH significant for Mental retardation, Non-verbal status, GERD, Barretts esophagus, Constipation, Cellulitis (or right thigh), presented to the ED secondary to lethargy.  Diagnosed with Urinary Tract Infection.       Urinary tract infection with hematuria  - patient with lethargy from living facility  - nitrite negative UTI  - c/w ceftriaxone  - s/p 1 liter NS IVF bolus; additional bolus ordered  - IVF  -urine cx: contiminated; blood culture: ntd  -discharge on ceftin 300mg oral bid for 5 more days     Cellulitis of right thigh  -recently treated for same dx  - erythema resolved, per residency aides, but firmness, tenderness and area of involvement remains the same (per aides)  - prescribed clincamycin 900 mg IV in the ED; continued on clindaymcin 600 mg IV Q8H  - discharge on clindamycin 450mg q 8hrs for 5 more days  -cellulitis improved no redness, no leukocytosis, and no fevers  -pt ambulating with staff.      Malnutrition  - albumin = 2.6  - currently on dysphagia diet     Dehydration  - BUN/Cr = 21/0.67 (ratio of 31:1)  - in the setting of UTI  - also contributing to presenting lethargy (now improved)  - hydration as mentioned above  -creatinine within normal limits     Gastroesophageal reflux disease  - chronic issue  - PPI - protonix  - HOB elevation.     DVT PPX   - lovenox    Dispo: back to [facility]

## 2017-01-20 NOTE — DISCHARGE NOTE ADULT - PATIENT PORTAL LINK FT
“You can access the FollowHealth Patient Portal, offered by [facility], by registering with the following website: [email or web address]”

## 2017-01-20 NOTE — DISCHARGE NOTE ADULT - MEDICATION SUMMARY - MEDICATIONS TO TAKE
I will START or STAY ON the medications listed below when I get home from the hospital:    clindamycin 450 mg oral capsule  -- 1 tab(s) by mouth every 8 hours  -- Indication: For Cellulitis of right thigh    Milk of Magnesia 8% oral suspension  -- 30 milliliter(s) by mouth 3 times a week (Tuesday, Thursday, Saturday)  -- Indication: For Bowel regimen     Mylanta oral suspension  -- 10 milliliter(s) by mouth 3 times a day  -- Indication: For Gastroesophageal reflux disease, esophagitis presence not specified    clonazePAM 1 mg oral tablet  -- 1 tab(s) by mouth 2 times a day  -- Indication: For anxiety    divalproex sodium 250 mg oral delayed release tablet  -- 1 tab(s) by mouth once a day (at bedtime); (together with 1000mg for total of 1250mg)   -- Indication: For psychosis    divalproex sodium 500 mg oral delayed release tablet  -- 2 tablets by mouth in the morning and in the evening   -- Indication: For psychosis    LORazepam 1 mg oral tablet  -- 1 tab(s) by mouth once a day, As Needed prior to Clinic  -- Indication: For ativan    traZODone 100 mg oral tablet  -- 1 tab(s) by mouth once a day (at bedtime)  -- Indication: For Depression    trihexyphenidyl 2 mg oral tablet  -- 0.5 tab(s) by mouth once a day (in the morning)  -- Indication: For estrapyramidal symptoms     Saphris Black Lara 5 mg sublingual tablet  -- 1 tab(s) under tongue once a day (in the morning) and 2 tab(s) in the evening  -- Indication: For psychosis    OLANZapine 5 mg oral tablet  -- 1 tab(s) by mouth once a day, As Needed clinic visit  -- Indication: For agitation    cefdinir 300 mg oral capsule  -- 1 cap(s) by mouth 2 times a day  -- Finish all this medication unless otherwise directed by prescriber.    -- Indication: For UTI (urinary tract infection)    zinc oxide 20% topical ointment  -- Apply on skin to affected area   -- Indication: For Dry skin    polyethylene glycol 3350 oral powder for reconstitution  -- 17 gram(s) by mouth once a day (at bedtime)  -- Indication: For Bowel regimen     docusate sodium 100 mg oral capsule  -- 1 cap(s) by mouth 2 times a day  -- Indication: For Bowel regimen     simethicone 80 mg oral tablet  -- 1 tab(s) by mouth 3 times a day (after meals)  -- Indication: For Gas    melatonin 3 mg oral tablet  -- 2 tab(s) by mouth once (at bedtime)  -- Indication: For insomnia     Sol-Bid oral tablet  -- 1 tab(s) by mouth once a day  -- Indication: For prophylactic     pantoprazole 40 mg oral delayed release tablet  -- 1 tab(s) by mouth once a day  -- Indication: For Gerd    Citrus Calcium with Vitamin D 200 mg-250 intl units oral tablet  -- 2 tab(s) by mouth once a day  -- Indication: For supplement     Daily Kerrie oral tablet  -- 1 tab(s) by mouth once a day  -- Indication: For supplement    folic acid 1 mg oral tablet  -- 1 tab(s) by mouth once a day  -- Indication: For supplement

## 2017-01-20 NOTE — DISCHARGE NOTE ADULT - CARE PLAN
Principal Discharge DX:	Cellulitis of right thigh  Goal:	remain free of infection  Instructions for follow-up, activity and diet:	Please follow up with the medical doctor upon arrival to [facility]. Please continue course of antibiotics as presribed.  Secondary Diagnosis:	UTI (urinary tract infection)  Goal:	remain free of infection  Instructions for follow-up, activity and diet:	Please follow up with the medical doctor upon arrival to [facility]. Please continue course of antibiotics as presribed.  Secondary Diagnosis:	MR (mental retardation), severe  Goal:	continue current regimen  Instructions for follow-up, activity and diet:	Please follow up with the psychiatrist at [facility] Principal Discharge DX:	Cellulitis of right thigh  Goal:	remain free of infection  Instructions for follow-up, activity and diet:	Please follow up with the medical doctor upon arrival to [facility]. Please continue course of antibiotics as presribed.  Secondary Diagnosis:	UTI (urinary tract infection)  Goal:	remain free of infection  Instructions for follow-up, activity and diet:	Please follow up with the medical doctor upon arrival to [facility]. Please continue course of antibiotics as presribed.  Secondary Diagnosis:	MR (mental retardation), severe  Goal:	continue current regimen  Instructions for follow-up, activity and diet:	Please follow up with the psychiatrist at [facility]

## 2017-01-20 NOTE — DISCHARGE NOTE ADULT - PLAN OF CARE
remain free of infection Please follow up with the medical doctor upon arrival to [facility]. Please continue course of antibiotics as presribed. continue current regimen Please follow up with the psychiatrist at [facility]

## 2017-01-21 LAB
BACTERIA BLD CULT: SIGNIFICANT CHANGE UP
BACTERIA BLD CULT: SIGNIFICANT CHANGE UP

## 2017-01-23 LAB
BACTERIA BLD CULT: SIGNIFICANT CHANGE UP
BACTERIA BLD CULT: SIGNIFICANT CHANGE UP

## 2020-10-04 NOTE — PATIENT PROFILE ADULT. - NS PRO AD NO ADVANCE DIRECTIVE
No normal... Well appearing, awake, alert, oriented to person, place, time/situation and in no apparent distress.

## 2023-03-21 NOTE — DIETITIAN INITIAL EVALUATION ADULT. - PROBLEM SELECTOR PROBLEM 4
Patient calm and cooperative. Wound care provided, wound dressings reviewed with patient and primary RN Adriana Saeed at bedside. All questions answered. See A&I flowsheet for full assessment details.  Patient calm and cooperative, ambulatory. Wound care provided, wound dressings reviewed with patient and primary RN Adriana Saeed at bedside. All questions answered. See A&I flowsheet for full assessment details.  Need for prophylactic measure

## 2024-06-07 NOTE — H&P ADULT. - PROBLEM/PLAN-4
Ambulatory Visit  Name: Apurva Dumont      : 2001      MRN: [id number]  Encounter Provider: Rebecca Fay Kab-Perlman, MD  Encounter Date: 2024   Encounter department: [facility]    Assessment & Plan   1. Encounter for immunization       History of Present Illness   {Disappearing Hyperlinks I Encounters * My Last Note * Since Last Visit * History :32000}  HPI    Review of Systems  {Select to Display PMH (Optional):97405}  Objective   {Disappearing Hyperlinks   Review Vitals * Enter New Vitals * Results Review * Labs * Imaging * Cardiology * Procedures * Lung Cancer Screening :83456}  LMP 05/10/2024 (Exact Date)     Physical Exam  Administrative Statements {Disappearing Hyperlinks I  Level of Service * PCMH/PCSP:99593}  {Time Spent Statement (Optional):03740}       DISPLAY PLAN FREE TEXT